# Patient Record
Sex: FEMALE | Race: WHITE | NOT HISPANIC OR LATINO | Employment: OTHER | URBAN - METROPOLITAN AREA
[De-identification: names, ages, dates, MRNs, and addresses within clinical notes are randomized per-mention and may not be internally consistent; named-entity substitution may affect disease eponyms.]

---

## 2017-04-26 ENCOUNTER — HOSPITAL ENCOUNTER (OUTPATIENT)
Dept: RADIOLOGY | Facility: MEDICAL CENTER | Age: 72
End: 2017-04-26
Attending: NURSE PRACTITIONER
Payer: COMMERCIAL

## 2017-04-26 DIAGNOSIS — Z12.31 VISIT FOR SCREENING MAMMOGRAM: ICD-10-CM

## 2017-04-26 PROCEDURE — G0202 SCR MAMMO BI INCL CAD: HCPCS

## 2018-11-07 ENCOUNTER — APPOINTMENT (RX ONLY)
Dept: URBAN - METROPOLITAN AREA CLINIC 4 | Facility: CLINIC | Age: 73
Setting detail: DERMATOLOGY
End: 2018-11-07

## 2018-11-07 DIAGNOSIS — Z71.89 OTHER SPECIFIED COUNSELING: ICD-10-CM

## 2018-11-07 DIAGNOSIS — D22 MELANOCYTIC NEVI: ICD-10-CM

## 2018-11-07 DIAGNOSIS — L81.4 OTHER MELANIN HYPERPIGMENTATION: ICD-10-CM

## 2018-11-07 DIAGNOSIS — L73.8 OTHER SPECIFIED FOLLICULAR DISORDERS: ICD-10-CM

## 2018-11-07 DIAGNOSIS — D18.0 HEMANGIOMA: ICD-10-CM

## 2018-11-07 DIAGNOSIS — L82.1 OTHER SEBORRHEIC KERATOSIS: ICD-10-CM

## 2018-11-07 PROBLEM — D18.01 HEMANGIOMA OF SKIN AND SUBCUTANEOUS TISSUE: Status: ACTIVE | Noted: 2018-11-07

## 2018-11-07 PROBLEM — D22.5 MELANOCYTIC NEVI OF TRUNK: Status: ACTIVE | Noted: 2018-11-07

## 2018-11-07 PROBLEM — D22.62 MELANOCYTIC NEVI OF LEFT UPPER LIMB, INCLUDING SHOULDER: Status: ACTIVE | Noted: 2018-11-07

## 2018-11-07 PROBLEM — D22.39 MELANOCYTIC NEVI OF OTHER PARTS OF FACE: Status: ACTIVE | Noted: 2018-11-07

## 2018-11-07 PROBLEM — D22.72 MELANOCYTIC NEVI OF LEFT LOWER LIMB, INCLUDING HIP: Status: ACTIVE | Noted: 2018-11-07

## 2018-11-07 PROBLEM — D22.71 MELANOCYTIC NEVI OF RIGHT LOWER LIMB, INCLUDING HIP: Status: ACTIVE | Noted: 2018-11-07

## 2018-11-07 PROBLEM — D22.61 MELANOCYTIC NEVI OF RIGHT UPPER LIMB, INCLUDING SHOULDER: Status: ACTIVE | Noted: 2018-11-07

## 2018-11-07 PROCEDURE — 99203 OFFICE O/P NEW LOW 30 MIN: CPT

## 2018-11-07 PROCEDURE — ? COUNSELING

## 2018-11-07 ASSESSMENT — LOCATION SIMPLE DESCRIPTION DERM
LOCATION SIMPLE: CHEST
LOCATION SIMPLE: RIGHT UPPER ARM
LOCATION SIMPLE: LEFT FOREHEAD
LOCATION SIMPLE: ABDOMEN
LOCATION SIMPLE: RIGHT ANTERIOR NECK
LOCATION SIMPLE: SUPERIOR FOREHEAD
LOCATION SIMPLE: RIGHT THIGH
LOCATION SIMPLE: UPPER BACK
LOCATION SIMPLE: LEFT THIGH
LOCATION SIMPLE: LEFT UPPER BACK
LOCATION SIMPLE: LEFT UPPER ARM

## 2018-11-07 ASSESSMENT — LOCATION ZONE DERM
LOCATION ZONE: TRUNK
LOCATION ZONE: FACE
LOCATION ZONE: ARM
LOCATION ZONE: NECK
LOCATION ZONE: LEG

## 2018-11-07 ASSESSMENT — LOCATION DETAILED DESCRIPTION DERM
LOCATION DETAILED: LEFT ANTERIOR DISTAL UPPER ARM
LOCATION DETAILED: LEFT ANTERIOR PROXIMAL UPPER ARM
LOCATION DETAILED: SUPERIOR THORACIC SPINE
LOCATION DETAILED: EPIGASTRIC SKIN
LOCATION DETAILED: LEFT SUPERIOR MEDIAL UPPER BACK
LOCATION DETAILED: LEFT INFERIOR MEDIAL FOREHEAD
LOCATION DETAILED: MIDDLE STERNUM
LOCATION DETAILED: INFERIOR THORACIC SPINE
LOCATION DETAILED: SUPERIOR MID FOREHEAD
LOCATION DETAILED: RIGHT INFERIOR ANTERIOR NECK
LOCATION DETAILED: RIGHT ANTERIOR PROXIMAL UPPER ARM
LOCATION DETAILED: LOWER STERNUM
LOCATION DETAILED: LEFT MEDIAL UPPER BACK
LOCATION DETAILED: RIGHT ANTERIOR PROXIMAL THIGH
LOCATION DETAILED: RIGHT ANTERIOR DISTAL UPPER ARM
LOCATION DETAILED: LEFT MEDIAL FOREHEAD
LOCATION DETAILED: LEFT ANTERIOR PROXIMAL THIGH
LOCATION DETAILED: LEFT SUPERIOR MEDIAL FOREHEAD

## 2019-05-22 ENCOUNTER — HOSPITAL ENCOUNTER (OUTPATIENT)
Dept: RADIOLOGY | Facility: MEDICAL CENTER | Age: 74
End: 2019-05-22
Attending: NURSE PRACTITIONER
Payer: MEDICARE

## 2019-05-22 DIAGNOSIS — Z12.31 SCREENING MAMMOGRAM, ENCOUNTER FOR: ICD-10-CM

## 2019-05-22 PROCEDURE — 77063 BREAST TOMOSYNTHESIS BI: CPT

## 2020-02-05 ENCOUNTER — APPOINTMENT (RX ONLY)
Dept: URBAN - METROPOLITAN AREA CLINIC 4 | Facility: CLINIC | Age: 75
Setting detail: DERMATOLOGY
End: 2020-02-05

## 2020-02-05 DIAGNOSIS — D17 BENIGN LIPOMATOUS NEOPLASM: ICD-10-CM

## 2020-02-05 DIAGNOSIS — L82.1 OTHER SEBORRHEIC KERATOSIS: ICD-10-CM

## 2020-02-05 DIAGNOSIS — L81.8 OTHER SPECIFIED DISORDERS OF PIGMENTATION: ICD-10-CM

## 2020-02-05 DIAGNOSIS — M71 OTHER BURSOPATHIES: ICD-10-CM

## 2020-02-05 DIAGNOSIS — D22 MELANOCYTIC NEVI: ICD-10-CM

## 2020-02-05 DIAGNOSIS — D18.0 HEMANGIOMA: ICD-10-CM

## 2020-02-05 DIAGNOSIS — Z71.89 OTHER SPECIFIED COUNSELING: ICD-10-CM

## 2020-02-05 DIAGNOSIS — L81.4 OTHER MELANIN HYPERPIGMENTATION: ICD-10-CM

## 2020-02-05 PROBLEM — D22.39 MELANOCYTIC NEVI OF OTHER PARTS OF FACE: Status: ACTIVE | Noted: 2020-02-05

## 2020-02-05 PROBLEM — M71.342 OTHER BURSAL CYST, LEFT HAND: Status: ACTIVE | Noted: 2020-02-05

## 2020-02-05 PROBLEM — D22.5 MELANOCYTIC NEVI OF TRUNK: Status: ACTIVE | Noted: 2020-02-05

## 2020-02-05 PROBLEM — D22.61 MELANOCYTIC NEVI OF RIGHT UPPER LIMB, INCLUDING SHOULDER: Status: ACTIVE | Noted: 2020-02-05

## 2020-02-05 PROBLEM — D22.72 MELANOCYTIC NEVI OF LEFT LOWER LIMB, INCLUDING HIP: Status: ACTIVE | Noted: 2020-02-05

## 2020-02-05 PROBLEM — D22.62 MELANOCYTIC NEVI OF LEFT UPPER LIMB, INCLUDING SHOULDER: Status: ACTIVE | Noted: 2020-02-05

## 2020-02-05 PROBLEM — D18.01 HEMANGIOMA OF SKIN AND SUBCUTANEOUS TISSUE: Status: ACTIVE | Noted: 2020-02-05

## 2020-02-05 PROBLEM — D22.71 MELANOCYTIC NEVI OF RIGHT LOWER LIMB, INCLUDING HIP: Status: ACTIVE | Noted: 2020-02-05

## 2020-02-05 PROBLEM — D17.22 BENIGN LIPOMATOUS NEOPLASM OF SKIN AND SUBCUTANEOUS TISSUE OF LEFT ARM: Status: ACTIVE | Noted: 2020-02-05

## 2020-02-05 PROCEDURE — 99214 OFFICE O/P EST MOD 30 MIN: CPT

## 2020-02-05 PROCEDURE — ? COUNSELING

## 2020-02-05 ASSESSMENT — LOCATION DETAILED DESCRIPTION DERM
LOCATION DETAILED: MIDDLE STERNUM
LOCATION DETAILED: INFERIOR MID FOREHEAD
LOCATION DETAILED: LEFT ANTERIOR DISTAL THIGH
LOCATION DETAILED: LEFT PROXIMAL DORSAL FOREARM
LOCATION DETAILED: LEFT MEDIAL FOREHEAD
LOCATION DETAILED: SUPERIOR MID FOREHEAD
LOCATION DETAILED: STERNAL NOTCH
LOCATION DETAILED: RIGHT ANTERIOR PROXIMAL UPPER ARM
LOCATION DETAILED: LEFT MEDIAL UPPER BACK
LOCATION DETAILED: LEFT ANTERIOR PROXIMAL UPPER ARM
LOCATION DETAILED: INFERIOR THORACIC SPINE
LOCATION DETAILED: LEFT INFERIOR MEDIAL FOREHEAD
LOCATION DETAILED: LEFT DISTAL DORSAL MIDDLE FINGER
LOCATION DETAILED: LEFT ANTERIOR DISTAL UPPER ARM
LOCATION DETAILED: EPIGASTRIC SKIN
LOCATION DETAILED: LEFT ANTERIOR PROXIMAL THIGH
LOCATION DETAILED: LEFT ELBOW
LOCATION DETAILED: RIGHT ANTERIOR PROXIMAL THIGH
LOCATION DETAILED: LOWER STERNUM
LOCATION DETAILED: RIGHT ANTERIOR DISTAL UPPER ARM
LOCATION DETAILED: SUPERIOR THORACIC SPINE
LOCATION DETAILED: RIGHT ANTERIOR DISTAL THIGH
LOCATION DETAILED: LEFT SUPERIOR MEDIAL UPPER BACK

## 2020-02-05 ASSESSMENT — LOCATION SIMPLE DESCRIPTION DERM
LOCATION SIMPLE: CHEST
LOCATION SIMPLE: LEFT MIDDLE FINGER
LOCATION SIMPLE: LEFT THIGH
LOCATION SIMPLE: INFERIOR FOREHEAD
LOCATION SIMPLE: LEFT UPPER ARM
LOCATION SIMPLE: RIGHT THIGH
LOCATION SIMPLE: LEFT UPPER BACK
LOCATION SIMPLE: LEFT FOREARM
LOCATION SIMPLE: SUPERIOR FOREHEAD
LOCATION SIMPLE: RIGHT UPPER ARM
LOCATION SIMPLE: LEFT ELBOW
LOCATION SIMPLE: LEFT FOREHEAD
LOCATION SIMPLE: ABDOMEN
LOCATION SIMPLE: UPPER BACK

## 2020-02-05 ASSESSMENT — LOCATION ZONE DERM
LOCATION ZONE: FACE
LOCATION ZONE: ARM
LOCATION ZONE: LEG
LOCATION ZONE: FINGER
LOCATION ZONE: TRUNK

## 2020-05-14 ENCOUNTER — GYNECOLOGY VISIT (OUTPATIENT)
Dept: OBGYN | Facility: CLINIC | Age: 75
End: 2020-05-14
Payer: COMMERCIAL

## 2020-05-14 VITALS — SYSTOLIC BLOOD PRESSURE: 113 MMHG | DIASTOLIC BLOOD PRESSURE: 76 MMHG | BODY MASS INDEX: 26.83 KG/M2 | WEIGHT: 175 LBS

## 2020-05-14 DIAGNOSIS — Z90.710 STATUS POST HYSTERECTOMY: ICD-10-CM

## 2020-05-14 DIAGNOSIS — R07.89 CHEST WALL PAIN: ICD-10-CM

## 2020-05-14 DIAGNOSIS — Z78.0 POST-MENOPAUSAL: ICD-10-CM

## 2020-05-14 DIAGNOSIS — Z12.39 SCREENING FOR BREAST CANCER: ICD-10-CM

## 2020-05-14 PROCEDURE — 99203 OFFICE O/P NEW LOW 30 MIN: CPT | Performed by: OBSTETRICS & GYNECOLOGY

## 2020-05-14 NOTE — PROGRESS NOTES
GYN Visit  CC: breast tenderness     Tracy Sam is a 74 y.o.  who presents today for breast tenderness.  Tender under her left arm.  Unsure if she hit it or lifted something inappropriately.  Notices it mostly when she rolls over in bed.  Only about a 2/10.  Doesn't think there is any mass or other associated symptoms.  She reports she has had annually had mammograms which have always been normal.  Reports that she had vaginal itching about two months ago but it resolved on it's own and she hasn't had any issues sense.  Didn't have any discharge or bleeding.  That was an isolated incidence and she has no current pelvic issues.      GYN History:  No LMP recorded. Patient has had a hysterectomy.. Menarche @12.  Menses regular, lasting 5 days, not particularly heavy.  Last pap  About 10 years ago,  no h/o abnormal pap.  Hysterectomy in 40s unsure reason, then bilateral oophorectomy done later - was then on HRT for many years and stopped it about 4 years ago.  No history of sexually transmitted diseases.  Not currently sexually active.  Used OCPs for about 5 years     OB History:    OB History    Para Term  AB Living   2         2   SAB TAB Ectopic Molar Multiple Live Births             2      # Outcome Date GA Lbr Jamie/2nd Weight Sex Delivery Anes PTL Lv   2             1             CS x 2 due to prior uterine surgery (unsure details)     Review of Systems:   Pertinent positives documented in HPI and all other systems reviewed & are negative.    All PMH, PSH, allergies, social history and FH reviewed and updated today:  Past Medical History:  Past Medical History:   Diagnosis Date   • Arthritis    • Subclavian artery stenosis, right (HCC)     In the setting of traumatic dissection in  follows with        Past Surgical History:  Past Surgical History:   Procedure Laterality Date   • HYSTERECTOMY, TOTAL ABDOMINAL  1990    fibroids   • APPENDECTOMY     • COLONOSCOPY     •  COLONOSCOPY      neg   • HYSTERECTOMY, VAGINAL     • PRIMARY C SECTION      x 2   • ROTATOR CUFF REPAIR      not repaired just monitored     Medications:   Current Outpatient Medications Ordered in Epic   Medication Sig Dispense Refill   • aspirin (ASA) 325 MG Tab Take 325 mg by mouth every 6 hours as needed.       No current Epic-ordered facility-administered medications on file.      Allergies: Patient has no known allergies.    Social History:  Social History     Socioeconomic History   • Marital status:      Spouse name: Not on file   • Number of children: Not on file   • Years of education: Not on file   • Highest education level: Not on file   Occupational History   • Not on file   Social Needs   • Financial resource strain: Not on file   • Food insecurity     Worry: Not on file     Inability: Not on file   • Transportation needs     Medical: Not on file     Non-medical: Not on file   Tobacco Use   • Smoking status: Former Smoker     Last attempt to quit: 2001     Years since quittin.3   • Smokeless tobacco: Never Used   Substance and Sexual Activity   • Alcohol use: Yes     Alcohol/week: 2.5 oz     Types: 5 Standard drinks or equivalent per week     Comment: mod   • Drug use: No   • Sexual activity: Not Currently     Comment:    Lifestyle   • Physical activity     Days per week: Not on file     Minutes per session: Not on file   • Stress: Not on file   Relationships   • Social connections     Talks on phone: Not on file     Gets together: Not on file     Attends [employer] service: Not on file     Active member of club or organization: Not on file     Attends meetings of clubs or organizations: Not on file     Relationship status: Not on file   • Intimate partner violence     Fear of current or ex partner: Not on file     Emotionally abused: Not on file     Physically abused: Not on file     Forced sexual activity: Not on file   Other Topics Concern   • Not on file   Social History  Narrative   • Not on file       Family History:  Family History   Problem Relation Age of Onset   • Heart Disease Father    • Cancer Sister         breast, age 59   • Diabetes Neg Hx    • Stroke Neg Hx       Objective:   Vitals:  /76   Wt 79.4 kg (175 lb)   Body mass index is 26.83 kg/m². (Goal BM I>18 <25)    Physical Exam:   Nursing note and vitals reviewed.  GENERAL: No acute distress  HENT: Atraumatic, normocephalic  EYES: Extraocular movements intact, pupils equal and reactive to light  NECK: Supple, Full ROM  CHEST: No deformities, Equal chest expansion, +chest wall pain over most lateral aspect of ribs 7-8.  Worse with palpation.  Lateral to breast tissue.  Approximates latisimus dorsi border  Bilateral dense breasts without any masses, skin changes, nipple discharge or other issue  RESP: Unlabored, no stridor or audible wheeze  ABD: Soft, Nontender, Non-Distended  Extremities: No Clubbing, Cyanosis, or Edema  Skin: Warm/dry, without rases  Neuro: A/O x 4, CN 2-12 Grossly intact, Motor/sensory grossly intact  Psych: Normal mood, behavior, and affect    Genitourinary: deferred     Assessment/Plan:   Tracy Sam is a 74 y.o.  female who presents for:    1. Screening for breast cancer  MA-DIAGNOSTIC MAMMO BILAT W/TOMOSYNTHESIS W/CAD   2. Status post hysterectomy     3. Post-menopausal     4. Chest wall pain       # Chest wall pain.  Suspect MSK etiology.  Encouraged supportive therapy  #Screen for breast cancer.  Annual mammo ordered  #vulvar itching.  Declines exam today as no current issue - encouraged to return if itching returns  #RTC annually unless issue prior    Patient was seen for 30 minutes of which > 50% of appointment time was spent on face-to-face counseling and coordination of care regarding the above.

## 2020-07-16 ENCOUNTER — HOSPITAL ENCOUNTER (OUTPATIENT)
Dept: RADIOLOGY | Facility: MEDICAL CENTER | Age: 75
End: 2020-07-16
Attending: FAMILY MEDICINE
Payer: COMMERCIAL

## 2020-07-16 DIAGNOSIS — Z12.31 VISIT FOR SCREENING MAMMOGRAM: ICD-10-CM

## 2020-07-16 PROCEDURE — 77067 SCR MAMMO BI INCL CAD: CPT

## 2021-01-14 DIAGNOSIS — Z23 NEED FOR VACCINATION: ICD-10-CM

## 2021-01-25 DIAGNOSIS — Z12.39 ENCOUNTER FOR SCREENING FOR MALIGNANT NEOPLASM OF BREAST, UNSPECIFIED SCREENING MODALITY: ICD-10-CM

## 2021-02-10 ENCOUNTER — APPOINTMENT (RX ONLY)
Dept: URBAN - METROPOLITAN AREA CLINIC 4 | Facility: CLINIC | Age: 76
Setting detail: DERMATOLOGY
End: 2021-02-10

## 2021-02-10 DIAGNOSIS — D18.0 HEMANGIOMA: ICD-10-CM

## 2021-02-10 DIAGNOSIS — D22 MELANOCYTIC NEVI: ICD-10-CM

## 2021-02-10 DIAGNOSIS — M71 OTHER BURSOPATHIES: ICD-10-CM

## 2021-02-10 DIAGNOSIS — L82.1 OTHER SEBORRHEIC KERATOSIS: ICD-10-CM

## 2021-02-10 DIAGNOSIS — L81.8 OTHER SPECIFIED DISORDERS OF PIGMENTATION: ICD-10-CM

## 2021-02-10 DIAGNOSIS — Z71.89 OTHER SPECIFIED COUNSELING: ICD-10-CM

## 2021-02-10 DIAGNOSIS — D17 BENIGN LIPOMATOUS NEOPLASM: ICD-10-CM

## 2021-02-10 DIAGNOSIS — L81.4 OTHER MELANIN HYPERPIGMENTATION: ICD-10-CM

## 2021-02-10 PROBLEM — M71.342 OTHER BURSAL CYST, LEFT HAND: Status: ACTIVE | Noted: 2021-02-10

## 2021-02-10 PROBLEM — D22.71 MELANOCYTIC NEVI OF RIGHT LOWER LIMB, INCLUDING HIP: Status: ACTIVE | Noted: 2021-02-10

## 2021-02-10 PROBLEM — D22.39 MELANOCYTIC NEVI OF OTHER PARTS OF FACE: Status: ACTIVE | Noted: 2021-02-10

## 2021-02-10 PROBLEM — D22.61 MELANOCYTIC NEVI OF RIGHT UPPER LIMB, INCLUDING SHOULDER: Status: ACTIVE | Noted: 2021-02-10

## 2021-02-10 PROBLEM — D17.22 BENIGN LIPOMATOUS NEOPLASM OF SKIN AND SUBCUTANEOUS TISSUE OF LEFT ARM: Status: ACTIVE | Noted: 2021-02-10

## 2021-02-10 PROBLEM — D22.62 MELANOCYTIC NEVI OF LEFT UPPER LIMB, INCLUDING SHOULDER: Status: ACTIVE | Noted: 2021-02-10

## 2021-02-10 PROBLEM — D18.01 HEMANGIOMA OF SKIN AND SUBCUTANEOUS TISSUE: Status: ACTIVE | Noted: 2021-02-10

## 2021-02-10 PROBLEM — D22.72 MELANOCYTIC NEVI OF LEFT LOWER LIMB, INCLUDING HIP: Status: ACTIVE | Noted: 2021-02-10

## 2021-02-10 PROBLEM — D23.39 OTHER BENIGN NEOPLASM OF SKIN OF OTHER PARTS OF FACE: Status: ACTIVE | Noted: 2021-02-10

## 2021-02-10 PROBLEM — D22.5 MELANOCYTIC NEVI OF TRUNK: Status: ACTIVE | Noted: 2021-02-10

## 2021-02-10 PROCEDURE — ? ADDITIONAL NOTES

## 2021-02-10 PROCEDURE — ? PHOTO-DOCUMENTATION

## 2021-02-10 PROCEDURE — ? COUNSELING

## 2021-02-10 PROCEDURE — ? SUNSCREEN TREATMENT REGIMEN

## 2021-02-10 PROCEDURE — 99213 OFFICE O/P EST LOW 20 MIN: CPT

## 2021-02-10 ASSESSMENT — LOCATION DETAILED DESCRIPTION DERM
LOCATION DETAILED: LEFT ANTERIOR PROXIMAL THIGH
LOCATION DETAILED: LEFT SUPERIOR MEDIAL UPPER BACK
LOCATION DETAILED: RIGHT ANTERIOR DISTAL UPPER ARM
LOCATION DETAILED: LEFT ELBOW
LOCATION DETAILED: LEFT MEDIAL FOREHEAD
LOCATION DETAILED: LEFT DISTAL DORSAL MIDDLE FINGER
LOCATION DETAILED: STERNAL NOTCH
LOCATION DETAILED: LEFT INFERIOR MEDIAL FOREHEAD
LOCATION DETAILED: INFERIOR MID FOREHEAD
LOCATION DETAILED: MIDDLE STERNUM
LOCATION DETAILED: LOWER STERNUM
LOCATION DETAILED: LEFT ANTERIOR PROXIMAL UPPER ARM
LOCATION DETAILED: INFERIOR THORACIC SPINE
LOCATION DETAILED: RIGHT ANTERIOR PROXIMAL UPPER ARM
LOCATION DETAILED: LEFT MEDIAL UPPER BACK
LOCATION DETAILED: LEFT ANTERIOR DISTAL THIGH
LOCATION DETAILED: RIGHT ANTERIOR DISTAL THIGH
LOCATION DETAILED: LEFT PROXIMAL DORSAL FOREARM
LOCATION DETAILED: EPIGASTRIC SKIN
LOCATION DETAILED: SUPERIOR MID FOREHEAD
LOCATION DETAILED: SUPERIOR THORACIC SPINE
LOCATION DETAILED: RIGHT ANTERIOR PROXIMAL THIGH
LOCATION DETAILED: LEFT ANTERIOR DISTAL UPPER ARM

## 2021-02-10 ASSESSMENT — LOCATION ZONE DERM
LOCATION ZONE: FINGER
LOCATION ZONE: ARM
LOCATION ZONE: TRUNK
LOCATION ZONE: FACE
LOCATION ZONE: LEG

## 2021-02-10 ASSESSMENT — LOCATION SIMPLE DESCRIPTION DERM
LOCATION SIMPLE: SUPERIOR FOREHEAD
LOCATION SIMPLE: LEFT THIGH
LOCATION SIMPLE: LEFT UPPER BACK
LOCATION SIMPLE: ABDOMEN
LOCATION SIMPLE: UPPER BACK
LOCATION SIMPLE: RIGHT THIGH
LOCATION SIMPLE: CHEST
LOCATION SIMPLE: LEFT UPPER ARM
LOCATION SIMPLE: RIGHT UPPER ARM
LOCATION SIMPLE: INFERIOR FOREHEAD
LOCATION SIMPLE: LEFT FOREHEAD
LOCATION SIMPLE: LEFT ELBOW
LOCATION SIMPLE: LEFT FOREARM
LOCATION SIMPLE: LEFT MIDDLE FINGER

## 2021-02-10 NOTE — PROCEDURE: ADDITIONAL NOTES
Additional Notes: Patient has been evaluated by hand specialist [facility], they recommend monitoring.
Render Risk Assessment In Note?: no
Detail Level: Simple

## 2021-02-10 NOTE — PROCEDURE: PHOTO-DOCUMENTATION
Details (Free Text): Lesion left eyelid photographed today. Will have patient RTC in 6-8 weeks to recheck. If lesion changes in the meantime, patient will call in which case we will refer to MD for biopsy.
Detail Level: Detailed
Photo Preface (Leave Blank If You Do Not Want): Photographs were obtained today

## 2021-02-19 ENCOUNTER — HOSPITAL ENCOUNTER (OUTPATIENT)
Dept: RADIOLOGY | Facility: MEDICAL CENTER | Age: 76
End: 2021-02-19
Attending: FAMILY MEDICINE
Payer: MEDICARE

## 2021-02-19 DIAGNOSIS — N95.9 MENOPAUSAL PROBLEM: ICD-10-CM

## 2021-02-19 DIAGNOSIS — N63.23 LUMP IN LOWER OUTER QUADRANT OF LEFT BREAST: ICD-10-CM

## 2021-02-19 DIAGNOSIS — M25.511 RIGHT SHOULDER PAIN, UNSPECIFIED CHRONICITY: ICD-10-CM

## 2021-02-19 DIAGNOSIS — N95.1 SYMPTOMATIC MENOPAUSAL OR FEMALE CLIMACTERIC STATES: ICD-10-CM

## 2021-02-19 PROCEDURE — G0279 TOMOSYNTHESIS, MAMMO: HCPCS | Mod: LT

## 2021-02-19 PROCEDURE — 73030 X-RAY EXAM OF SHOULDER: CPT | Mod: RT

## 2021-02-19 PROCEDURE — 76642 ULTRASOUND BREAST LIMITED: CPT | Mod: LT

## 2021-02-19 PROCEDURE — 77080 DXA BONE DENSITY AXIAL: CPT

## 2021-04-20 ENCOUNTER — APPOINTMENT (RX ONLY)
Dept: URBAN - METROPOLITAN AREA CLINIC 4 | Facility: CLINIC | Age: 76
Setting detail: DERMATOLOGY
End: 2021-04-20

## 2021-04-20 DIAGNOSIS — L81.4 OTHER MELANIN HYPERPIGMENTATION: ICD-10-CM

## 2021-04-20 DIAGNOSIS — Z71.89 OTHER SPECIFIED COUNSELING: ICD-10-CM

## 2021-04-20 PROBLEM — D23.39 OTHER BENIGN NEOPLASM OF SKIN OF OTHER PARTS OF FACE: Status: ACTIVE | Noted: 2021-04-20

## 2021-04-20 PROCEDURE — 99213 OFFICE O/P EST LOW 20 MIN: CPT

## 2021-04-20 PROCEDURE — ? COUNSELING

## 2021-04-20 PROCEDURE — ? SUNSCREEN TREATMENT REGIMEN

## 2021-04-20 PROCEDURE — ? PHOTO-DOCUMENTATION

## 2021-04-20 PROCEDURE — ? ADDITIONAL NOTES

## 2021-04-20 ASSESSMENT — LOCATION DETAILED DESCRIPTION DERM
LOCATION DETAILED: LEFT INFERIOR MEDIAL FOREHEAD
LOCATION DETAILED: LOWER STERNUM
LOCATION DETAILED: RIGHT ANTERIOR DISTAL THIGH
LOCATION DETAILED: RIGHT ANTERIOR DISTAL UPPER ARM
LOCATION DETAILED: SUPERIOR MID FOREHEAD
LOCATION DETAILED: LEFT ANTERIOR DISTAL THIGH
LOCATION DETAILED: LEFT SUPERIOR MEDIAL UPPER BACK
LOCATION DETAILED: LEFT ANTERIOR DISTAL UPPER ARM

## 2021-04-20 ASSESSMENT — LOCATION SIMPLE DESCRIPTION DERM
LOCATION SIMPLE: RIGHT UPPER ARM
LOCATION SIMPLE: SUPERIOR FOREHEAD
LOCATION SIMPLE: LEFT FOREHEAD
LOCATION SIMPLE: RIGHT THIGH
LOCATION SIMPLE: LEFT THIGH
LOCATION SIMPLE: CHEST
LOCATION SIMPLE: LEFT UPPER BACK
LOCATION SIMPLE: LEFT UPPER ARM

## 2021-04-20 ASSESSMENT — LOCATION ZONE DERM
LOCATION ZONE: LEG
LOCATION ZONE: TRUNK
LOCATION ZONE: FACE
LOCATION ZONE: ARM

## 2021-04-20 NOTE — PROCEDURE: PHOTO-DOCUMENTATION
Details (Free Text): Lesion left eyelid photographed again today. No change vs last visit, will continue to monitor closely. Discussed biopsy, patient wishes to monitor.
Detail Level: Detailed
Photo Preface (Leave Blank If You Do Not Want): Photographs were obtained today

## 2021-04-20 NOTE — PROCEDURE: ADDITIONAL NOTES
Detail Level: Simple
Render Risk Assessment In Note?: no
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

## 2021-04-23 ENCOUNTER — HOSPITAL ENCOUNTER (OUTPATIENT)
Dept: RADIOLOGY | Facility: MEDICAL CENTER | Age: 76
End: 2021-04-23
Attending: FAMILY MEDICINE
Payer: MEDICARE

## 2021-04-23 DIAGNOSIS — M54.50 LOW BACK PAIN, UNSPECIFIED BACK PAIN LATERALITY, UNSPECIFIED CHRONICITY, UNSPECIFIED WHETHER SCIATICA PRESENT: ICD-10-CM

## 2021-04-23 PROCEDURE — 72110 X-RAY EXAM L-2 SPINE 4/>VWS: CPT

## 2021-08-04 PROBLEM — M67.442 DIGITAL MUCOUS CYST OF LEFT HAND: Status: ACTIVE | Noted: 2021-08-04

## 2021-08-10 ENCOUNTER — TELEPHONE (OUTPATIENT)
Dept: SURGERY | Facility: MEDICAL CENTER | Age: 76
End: 2021-08-10

## 2022-04-19 ENCOUNTER — OFFICE VISIT (OUTPATIENT)
Dept: CARDIOLOGY | Facility: MEDICAL CENTER | Age: 77
End: 2022-04-19
Payer: MEDICARE

## 2022-04-19 VITALS
OXYGEN SATURATION: 97 % | WEIGHT: 172 LBS | SYSTOLIC BLOOD PRESSURE: 142 MMHG | RESPIRATION RATE: 16 BRPM | HEIGHT: 68 IN | DIASTOLIC BLOOD PRESSURE: 74 MMHG | HEART RATE: 62 BPM | BODY MASS INDEX: 26.07 KG/M2

## 2022-04-19 DIAGNOSIS — I10 ESSENTIAL HYPERTENSION, BENIGN: ICD-10-CM

## 2022-04-19 DIAGNOSIS — I77.1 SUBCLAVIAN ARTERY STENOSIS, RIGHT (HCC): ICD-10-CM

## 2022-04-19 DIAGNOSIS — E78.5 DYSLIPIDEMIA: ICD-10-CM

## 2022-04-19 LAB — EKG IMPRESSION: NORMAL

## 2022-04-19 PROCEDURE — 99204 OFFICE O/P NEW MOD 45 MIN: CPT | Performed by: INTERNAL MEDICINE

## 2022-04-19 PROCEDURE — 93000 ELECTROCARDIOGRAM COMPLETE: CPT | Mod: GZ | Performed by: INTERNAL MEDICINE

## 2022-04-19 RX ORDER — M-VIT,TX,IRON,MINS/CALC/FOLIC 27MG-0.4MG
1 TABLET ORAL DAILY
COMMUNITY

## 2022-04-19 ASSESSMENT — ENCOUNTER SYMPTOMS
CONSTITUTIONAL NEGATIVE: 1
HEADACHES: 0
WEIGHT LOSS: 0
DEPRESSION: 0
RESPIRATORY NEGATIVE: 1
NEUROLOGICAL NEGATIVE: 1
ABDOMINAL PAIN: 0
PALPITATIONS: 0
NERVOUS/ANXIOUS: 0
VOMITING: 0
MYALGIAS: 0
GASTROINTESTINAL NEGATIVE: 1
CLAUDICATION: 0
FEVER: 0
CHILLS: 0
MUSCULOSKELETAL NEGATIVE: 1
SHORTNESS OF BREATH: 0
NAUSEA: 0
FOCAL WEAKNESS: 0
BLURRED VISION: 0
BRUISES/BLEEDS EASILY: 0
DIZZINESS: 0
COUGH: 0
PSYCHIATRIC NEGATIVE: 1
WEAKNESS: 0
DOUBLE VISION: 0
EYES NEGATIVE: 1

## 2022-04-19 NOTE — PROGRESS NOTES
Chief Complaint   Patient presents with   • Other     F/V Dx: Subclavian artery stenosis, right (CMS-HCC)       Subjective     Tracy Sam is a 76 y.o. 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    Ms. 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 She was previously seen in our office by Holden Wilson on 16.    Past Medical History:   Diagnosis Date   • Arthritis    • Subclavian artery stenosis, right (HCC)     In the setting of traumatic dissection in  follows with    • Subclavian artery stenosis, right (HCC)      Past Surgical History:   Procedure Laterality Date   • HYSTERECTOMY, TOTAL ABDOMINAL      fibroids   • APPENDECTOMY     • COLONOSCOPY     • COLONOSCOPY      neg   • HYSTERECTOMY, VAGINAL     • PRIMARY C SECTION      x 2   • ROTATOR CUFF REPAIR      not repaired just monitored     Family History   Problem Relation Age of Onset   • Heart Disease Father    • Cancer Sister         breast, age 59   • Diabetes Neg Hx    • Stroke Neg Hx      Social History     Socioeconomic History   • Marital status:      Spouse name: Not on file   • Number of children: Not on file   • Years of education: Not on file   • Highest education level: Not on file   Occupational History   • Not on file   Tobacco Use   • Smoking status: Former Smoker     Quit date: 2001     Years since quittin.3   • Smokeless tobacco: Never Used   Substance and Sexual Activity   • Alcohol use: Yes     Alcohol/week: 1.2 oz     Types: 2 Glasses of wine per week   • Drug use: No   • Sexual activity: Not Currently     Comment:    Other Topics Concern   • Not on file   Social History Narrative   • Not on file     Social Determinants of Health     Financial Resource Strain: Not on file   Food  "Insecurity: Not on file   Transportation Needs: Not on file   Physical Activity: Not on file   Stress: Not on file   Social Connections: Not on file   Intimate Partner Violence: Not on file   Housing Stability: Not on file     No Known Allergies     (Medications reviewed.)  Outpatient Encounter Medications as of 4/19/2022   Medication Sig Dispense Refill   • therapeutic multivitamin-minerals (THERAGRAN-M) Tab Take 1 Tablet by mouth every day.     • ketoconazole (NIZORAL) 2 % Cream APPLY CREAM TOPICALLY TWICE DAILY TO TOES AND FEET     • [DISCONTINUED] aspirin (ASA) 325 MG Tab Take 325 mg by mouth every 6 hours as needed.       No facility-administered encounter medications on file as of 4/19/2022.     Review of Systems   Constitutional: Negative.  Negative for chills, fever, malaise/fatigue and weight loss.   HENT: Negative.  Negative for hearing loss.    Eyes: Negative.  Negative for blurred vision and double vision.   Respiratory: Negative.  Negative for cough and shortness of breath.    Cardiovascular: Positive for chest pain. Negative for palpitations, claudication and leg swelling.   Gastrointestinal: Negative.  Negative for abdominal pain, nausea and vomiting.   Genitourinary: Negative.  Negative for dysuria and urgency.   Musculoskeletal: Negative.  Negative for joint pain and myalgias.   Skin: Negative.  Negative for itching and rash.   Neurological: Negative.  Negative for dizziness, focal weakness, weakness and headaches.   Endo/Heme/Allergies: Negative.  Does not bruise/bleed easily.   Psychiatric/Behavioral: Negative.  Negative for depression. The patient is not nervous/anxious.               Objective     /74 (BP Location: Left arm, Patient Position: Sitting, BP Cuff Size: Adult)   Pulse 62   Resp 16   Ht 1.727 m (5' 8\")   Wt 78 kg (172 lb)   SpO2 97%   BMI 26.15 kg/m²     Physical Exam  Constitutional:       Appearance: She is well-developed.   HENT:      Head: Normocephalic and atraumatic. "   Neck:      Vascular: No JVD.   Cardiovascular:      Rate and Rhythm: Normal rate and regular rhythm.      Heart sounds: Normal heart sounds.   Pulmonary:      Effort: Pulmonary effort is normal.      Breath sounds: Normal breath sounds.   Abdominal:      General: Bowel sounds are normal.      Palpations: Abdomen is soft.      Comments: No hepatosplenomegaly.   Musculoskeletal:         General: Normal range of motion.   Lymphadenopathy:      Cervical: No cervical adenopathy.   Skin:     General: Skin is warm and dry.   Neurological:      Mental Status: She is alert and oriented to person, place, and time.            CARDIAC STUDIES/PROCEDURES:    CTA OF ABDOMEN (02/21/13)  Calcified atherosclerosis may be seen of the abdominal aorta.   (study result reviewed)    CTA OF ABDOMEN (12/01/17)  No abdominal aorta aneurysm.  (study result reviewed)    ECHOCARDIOGRAM CONCLUSIONS (07/20/16)  No prior study is available for comparison.   Normal left ventricular chamber size.  Left ventricular ejection fraction is visually estimated to be 60%.  Grade I diastolic dysfunction.  Mild mitral regurgitation.  Mild tricuspid regurgitation.  Estimated right ventricular systolic pressure  is 25 mmHg.  (study result reviewed)    EKG was ordered for subclavian artery stenosis, performed on (04/19/22) was reviewed: EKG, personally interpreted shows sinus rhythm.    Laboratory results of (04/22) were reviewed. Cholesterol profile of 240/153/65/153 mg/dL noted.    MYOCARDIAL PERFUSION STUDY CONCLUSIONS (04/08/16)  Normal left ventricular size, ejection fraction, and wall motion.  (study result reviewed)    UPPER EXTREMITY ARTERIAL ULTRASOUND [facility] (04/26/05)  A long segment narrowing in the subclavian as well as axillary artery is noted. This may be due to wall thickening or thrombus. Distal narrowing with post stenotic flow in the rest of the arm is visualized.  On the left there is also abnormal flow pattern peripherally, with  relative normal flow patterns in the upper arm.  Proximally the subclavian artery has an irregular outline and an abnormal wave pattern.  The peak systolic velocity is however not decreased.  The common carotid arteries appear normal with normal doppler examination.   (study result reviewed)    Assessment & Plan     1. Subclavian artery stenosis, right (HCC)  EKG   2. Essential hypertension, benign     3. Dyslipidemia         Medical Decision Making: Today's Assessment/Status/Plan:        1. 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  Vascular surgeon's report on 06/09/05: The patient had previous trauma to the shoulder girdle in February and this most probably caused dissection of the subclavian artery. There are no clinical sign of acute ischemia at present and there is an excellent chance that the artery will remodel over time. I have placed her on clopidogrel and she should be followed up regularly. If the condition of the arm worsens, she could be considered for PTA and stenting, alternatively axillary bypass, although I do not foresee this happening. We did discuss the options of performing myocardial perfusion imaging study, echocardiogram, however, she does not wish to schedule this procedure at this time.  2. Hypertension: Blood pressure has been high. We will start metoprolol 25 mg BID and reassess the blood pressure.  3. Hyperlipidemia: We will order labs.  Greater than 45 minutes of time was spent to review all above information; of which more than 50% of the time was face to face reviewing thee patient's medical issues, medication evaluation, study result review, lab results review    We will follow up in 3 months.    CC Italia Rodríguez

## 2022-07-15 ENCOUNTER — HOSPITAL ENCOUNTER (OUTPATIENT)
Dept: LAB | Facility: MEDICAL CENTER | Age: 77
End: 2022-07-15
Attending: INTERNAL MEDICINE
Payer: MEDICARE

## 2022-07-15 DIAGNOSIS — E78.5 DYSLIPIDEMIA: ICD-10-CM

## 2022-07-15 LAB
ALBUMIN SERPL BCP-MCNC: 4.1 G/DL (ref 3.2–4.9)
ALBUMIN/GLOB SERPL: 1.6 G/DL
ALP SERPL-CCNC: 58 U/L (ref 30–99)
ALT SERPL-CCNC: 13 U/L (ref 2–50)
ANION GAP SERPL CALC-SCNC: 13 MMOL/L (ref 7–16)
AST SERPL-CCNC: 17 U/L (ref 12–45)
BILIRUB SERPL-MCNC: 0.4 MG/DL (ref 0.1–1.5)
BUN SERPL-MCNC: 16 MG/DL (ref 8–22)
CALCIUM SERPL-MCNC: 9.3 MG/DL (ref 8.5–10.5)
CHLORIDE SERPL-SCNC: 103 MMOL/L (ref 96–112)
CHOLEST SERPL-MCNC: 192 MG/DL (ref 100–199)
CO2 SERPL-SCNC: 24 MMOL/L (ref 20–33)
CREAT SERPL-MCNC: 0.74 MG/DL (ref 0.5–1.4)
FASTING STATUS PATIENT QL REPORTED: NORMAL
GFR SERPLBLD CREATININE-BSD FMLA CKD-EPI: 83 ML/MIN/1.73 M 2
GLOBULIN SER CALC-MCNC: 2.5 G/DL (ref 1.9–3.5)
GLUCOSE SERPL-MCNC: 86 MG/DL (ref 65–99)
HDLC SERPL-MCNC: 59 MG/DL
LDLC SERPL CALC-MCNC: 118 MG/DL
POTASSIUM SERPL-SCNC: 3.8 MMOL/L (ref 3.6–5.5)
PROT SERPL-MCNC: 6.6 G/DL (ref 6–8.2)
SODIUM SERPL-SCNC: 140 MMOL/L (ref 135–145)
TRIGL SERPL-MCNC: 76 MG/DL (ref 0–149)

## 2022-07-15 PROCEDURE — 80061 LIPID PANEL: CPT

## 2022-07-15 PROCEDURE — 36415 COLL VENOUS BLD VENIPUNCTURE: CPT

## 2022-07-15 PROCEDURE — 80053 COMPREHEN METABOLIC PANEL: CPT

## 2022-07-18 ENCOUNTER — HOSPITAL ENCOUNTER (OUTPATIENT)
Dept: RADIOLOGY | Facility: MEDICAL CENTER | Age: 77
End: 2022-07-18
Attending: INTERNAL MEDICINE
Payer: MEDICARE

## 2022-07-18 DIAGNOSIS — I77.1 SUBCLAVIAN ARTERY STENOSIS, RIGHT (HCC): ICD-10-CM

## 2022-07-18 PROCEDURE — 700117 HCHG RX CONTRAST REV CODE 255: Performed by: INTERNAL MEDICINE

## 2022-07-18 PROCEDURE — 71275 CT ANGIOGRAPHY CHEST: CPT | Mod: MG

## 2022-07-18 RX ADMIN — IOHEXOL 100 ML: 350 INJECTION, SOLUTION INTRAVENOUS at 14:45

## 2022-07-29 ENCOUNTER — OFFICE VISIT (OUTPATIENT)
Dept: CARDIOLOGY | Facility: MEDICAL CENTER | Age: 77
End: 2022-07-29
Payer: MEDICARE

## 2022-07-29 VITALS
HEART RATE: 66 BPM | SYSTOLIC BLOOD PRESSURE: 118 MMHG | OXYGEN SATURATION: 97 % | RESPIRATION RATE: 14 BRPM | WEIGHT: 167.3 LBS | DIASTOLIC BLOOD PRESSURE: 72 MMHG | HEIGHT: 68 IN | BODY MASS INDEX: 25.36 KG/M2

## 2022-07-29 DIAGNOSIS — I73.9 PVD (PERIPHERAL VASCULAR DISEASE) (HCC): ICD-10-CM

## 2022-07-29 DIAGNOSIS — E78.5 DYSLIPIDEMIA: ICD-10-CM

## 2022-07-29 DIAGNOSIS — I10 ESSENTIAL HYPERTENSION, BENIGN: ICD-10-CM

## 2022-07-29 PROCEDURE — 99214 OFFICE O/P EST MOD 30 MIN: CPT | Performed by: INTERNAL MEDICINE

## 2022-07-29 ASSESSMENT — ENCOUNTER SYMPTOMS
COUGH: 0
NERVOUS/ANXIOUS: 0
NEUROLOGICAL NEGATIVE: 1
DEPRESSION: 0
MYALGIAS: 0
CARDIOVASCULAR NEGATIVE: 1
BLURRED VISION: 0
PALPITATIONS: 0
FOCAL WEAKNESS: 0
WEIGHT LOSS: 0
VOMITING: 0
HEADACHES: 0
CLAUDICATION: 0
NAUSEA: 0
DOUBLE VISION: 0
EYES NEGATIVE: 1
PSYCHIATRIC NEGATIVE: 1
FEVER: 0
WEAKNESS: 0
CONSTITUTIONAL NEGATIVE: 1
ABDOMINAL PAIN: 0
MUSCULOSKELETAL NEGATIVE: 1
GASTROINTESTINAL NEGATIVE: 1
BRUISES/BLEEDS EASILY: 0
SHORTNESS OF BREATH: 0
DIZZINESS: 0
RESPIRATORY NEGATIVE: 1
CHILLS: 0

## 2022-07-29 NOTE — PROGRESS NOTES
Chief Complaint   Patient presents with   • Aortic Stenosis     F/V Dx: Subclavian artery stenosis, right (HCC)       Subjective     Tracy Sam is a 77 y.o. female who presents today for follow up of hypertension, medication change evaluation, blood pressure assessment, peripheral vascular disease.    Since the patient's last visit on 22, she has been doing well clinically. She denies chest pain, shortness of breath, palpitations, nausea/vomiting or diaphoresis. On the last visit she was started on metoprolol and is tolerating this medication well without side effects. She is pending eye lid surgery.     Past Medical History:   Diagnosis Date   • Arthritis    • Dyslipidemia 2022   • Essential hypertension, benign 2022   • Subclavian artery stenosis, right (HCC)     In the setting of traumatic dissection in  follows with    • Subclavian artery stenosis, right (HCC)      Past Surgical History:   Procedure Laterality Date   • HYSTERECTOMY, TOTAL ABDOMINAL  1990    fibroids   • APPENDECTOMY     • COLONOSCOPY     • COLONOSCOPY      neg   • HYSTERECTOMY, VAGINAL     • PRIMARY C SECTION      x 2   • ROTATOR CUFF REPAIR      not repaired just monitored     Family History   Problem Relation Age of Onset   • Heart Disease Father    • Cancer Sister         breast, age 59   • Diabetes Neg Hx    • Stroke Neg Hx      Social History     Socioeconomic History   • Marital status:      Spouse name: Not on file   • Number of children: Not on file   • Years of education: Not on file   • Highest education level: Not on file   Occupational History   • Not on file   Tobacco Use   • Smoking status: Former Smoker     Quit date: 2001     Years since quittin.5   • Smokeless tobacco: Never Used   Substance and Sexual Activity   • Alcohol use: Yes     Alcohol/week: 1.2 oz     Types: 2 Glasses of wine per week   • Drug use: No   • Sexual activity: Not Currently     Comment:    Other  Topics Concern   • Not on file   Social History Narrative   • Not on file     Social Determinants of Health     Financial Resource Strain: Not on file   Food Insecurity: Not on file   Transportation Needs: Not on file   Physical Activity: Not on file   Stress: Not on file   Social Connections: Not on file   Intimate Partner Violence: Not on file   Housing Stability: Not on file     No Known Allergies     (Medications reviewed.)  Outpatient Encounter Medications as of 7/29/2022   Medication Sig Dispense Refill   • therapeutic multivitamin-minerals (THERAGRAN-M) Tab Take 1 Tablet by mouth every day.     • ketoconazole (NIZORAL) 2 % Cream APPLY CREAM TOPICALLY TWICE DAILY TO TOES AND FEET     • metoprolol tartrate (LOPRESSOR) 25 MG Tab Take 1 Tablet by mouth 2 times a day. (Patient not taking: Reported on 7/29/2022) 60 Tablet 11     No facility-administered encounter medications on file as of 7/29/2022.     Review of Systems   Constitutional: Negative.  Negative for chills, fever, malaise/fatigue and weight loss.   HENT: Negative.  Negative for hearing loss.    Eyes: Negative.  Negative for blurred vision and double vision.   Respiratory: Negative.  Negative for cough and shortness of breath.    Cardiovascular: Negative.  Negative for chest pain, palpitations, claudication and leg swelling.   Gastrointestinal: Negative.  Negative for abdominal pain, nausea and vomiting.   Genitourinary: Negative.  Negative for dysuria and urgency.   Musculoskeletal: Negative.  Negative for joint pain and myalgias.   Skin: Negative.  Negative for itching and rash.   Neurological: Negative.  Negative for dizziness, focal weakness, weakness and headaches.   Endo/Heme/Allergies: Negative.  Does not bruise/bleed easily.   Psychiatric/Behavioral: Negative.  Negative for depression. The patient is not nervous/anxious.               Objective     /72 (BP Location: Left arm, Patient Position: Sitting, BP Cuff Size: Adult)   Pulse 66    "Resp 14   Ht 1.727 m (5' 8\")   Wt 75.9 kg (167 lb 4.8 oz)   SpO2 97%   BMI 25.44 kg/m²     Physical Exam  Constitutional:       Appearance: She is well-developed.   HENT:      Head: Normocephalic and atraumatic.   Neck:      Vascular: No JVD.   Cardiovascular:      Rate and Rhythm: Normal rate and regular rhythm.      Heart sounds: Normal heart sounds.   Pulmonary:      Effort: Pulmonary effort is normal.      Breath sounds: Normal breath sounds.   Abdominal:      General: Bowel sounds are normal.      Palpations: Abdomen is soft.      Comments: No hepatosplenomegaly.   Musculoskeletal:         General: Normal range of motion.   Lymphadenopathy:      Cervical: No cervical adenopathy.   Skin:     General: Skin is warm and dry.   Neurological:      Mental Status: She is alert and oriented to person, place, and time.            CARDIAC STUDIES/PROCEDURES:     CTA OF ABDOMEN (02/21/13)  Calcified atherosclerosis may be seen of the abdominal aorta.      CTA OF ABDOMEN (12/01/17)  No abdominal aorta aneurysm.     ECHOCARDIOGRAM CONCLUSIONS (07/20/16)  No prior study is available for comparison.   Normal left ventricular chamber size.  Left ventricular ejection fraction is visually estimated to be 60%.  Grade I diastolic dysfunction.  Mild mitral regurgitation.  Mild tricuspid regurgitation.  Estimated right ventricular systolic pressure  is 25 mmHg.     EKG performed on (04/19/22) EKG shows sinus rhythm.    Laboratory results of (07/15/22) were reviewed. Cholesterol profile of 192/76/59/118 mg/dL noted.  Laboratory results of (04/22) Cholesterol profile of 240/153/65/153 mg/dL noted.     MYOCARDIAL PERFUSION STUDY CONCLUSIONS (04/08/16)  Normal left ventricular size, ejection fraction, and wall motion.    UPPER EXTREMITY ARTERIAL ULTRASOUND [facility] (04/26/05)  A long segment narrowing in the subclavian as well as axillary artery is noted. This may be due to wall thickening or thrombus. Distal narrowing with post " stenotic flow in the rest of the arm is visualized.  On the left there is also abnormal flow pattern peripherally, with relative normal flow patterns in the upper arm.  Proximally the subclavian artery has an irregular outline and an abnormal wave pattern.  The peak systolic velocity is however not decreased.  The common carotid arteries appear normal with normal doppler examination.     Assessment & Plan     1. Essential hypertension, benign     2. Dyslipidemia     3. PVD (peripheral vascular disease) ([facility])         Medical Decision Making: Today's Assessment/Status/Plan:        1. Hypertension: Blood pressure is well controlled. We will continue with metoprolol.  2. Hyperlipidemia: Currently not well controlled on strict diet and exercise therapy. We will repeat labs including fasting lipid profile in 3 months.  3. 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 There are no clinical sign of acute ischemia at present and there is an excellent chance that the artery will remodel over time. I have placed her on clopidogrel and she should be followed up regularly. If the condition of the arm worsens, she could be considered for PTA and stenting, alternatively axillary bypass, although I do not foresee this happening) Stable on above medical therapy. We discussed option of referral to vascular surgery, however, she declines.   4. Health maintenance: We have discussed the options of performing myocardial perfusion imaging study, echocardiogram, however, she does not wish to schedule this procedure at this time.    We will follow up in 3 months.    CC Italia Rodríguez

## 2022-08-23 ENCOUNTER — TELEPHONE (OUTPATIENT)
Dept: CARDIOLOGY | Facility: MEDICAL CENTER | Age: 77
End: 2022-08-23
Payer: MEDICARE

## 2022-08-24 NOTE — TELEPHONE ENCOUNTER
Received clearance request from [facility]. Discussed with SC who indicates patient may proceed with bilateral upper lid blepharoplasty. Clearance response faxed to [phone], receipt confirmed.

## 2022-11-11 ENCOUNTER — PATIENT MESSAGE (OUTPATIENT)
Dept: HEALTH INFORMATION MANAGEMENT | Facility: OTHER | Age: 77
End: 2022-11-11

## 2022-12-06 ENCOUNTER — HOSPITAL ENCOUNTER (OUTPATIENT)
Dept: RADIOLOGY | Facility: MEDICAL CENTER | Age: 77
End: 2022-12-06
Attending: FAMILY MEDICINE
Payer: MEDICARE

## 2022-12-06 DIAGNOSIS — M25.511 RIGHT SHOULDER PAIN, UNSPECIFIED CHRONICITY: ICD-10-CM

## 2022-12-06 DIAGNOSIS — M79.601 RIGHT UPPER LIMB PAIN: ICD-10-CM

## 2022-12-06 PROCEDURE — 73060 X-RAY EXAM OF HUMERUS: CPT | Mod: RT

## 2022-12-06 PROCEDURE — 73030 X-RAY EXAM OF SHOULDER: CPT | Mod: RT

## 2023-01-17 ENCOUNTER — APPOINTMENT (OUTPATIENT)
Dept: VASCULAR SURGERY | Facility: MEDICAL CENTER | Age: 78
End: 2023-01-17
Payer: MEDICARE

## 2023-02-02 ENCOUNTER — OFFICE VISIT (OUTPATIENT)
Dept: VASCULAR SURGERY | Facility: MEDICAL CENTER | Age: 78
End: 2023-02-02
Payer: MEDICARE

## 2023-02-02 VITALS
DIASTOLIC BLOOD PRESSURE: 58 MMHG | TEMPERATURE: 97.3 F | OXYGEN SATURATION: 99 % | HEIGHT: 68 IN | BODY MASS INDEX: 25.46 KG/M2 | WEIGHT: 168 LBS | HEART RATE: 63 BPM | SYSTOLIC BLOOD PRESSURE: 112 MMHG

## 2023-02-02 DIAGNOSIS — S25.101A: ICD-10-CM

## 2023-02-02 PROCEDURE — 99202 OFFICE O/P NEW SF 15 MIN: CPT | Performed by: SURGERY

## 2023-02-02 NOTE — H&P
Vascular Surgery            New Patient Consultation    Patient:Tracy Sam  MRN:[id number]  Primary care physician:Italia Crain M.D.  Referring Provider: Dandy Cunha DO    Vascular Consultant: Josh Huber MD    Date: 2/2/2023  _____________________________________________________    Chief Complaint:     Chief Complaint   Patient presents with    New Patient      Atherosclerosis of other arteries         History of Present Illness:   Tracy Sam  is a 77 y.o. year old female who was referred for evaluation of her right subclavian artery.  The patient reports that several years ago she sustained trauma to her right shoulder region and had an associated right subclavian artery injury.  This all occurred in [place].  The patient recently had a pacemaker placed and during the course of that evaluation it was deemed that she perhaps needed follow-up for this right subclavian issue.  The patient reports that she still has some disability related to the shoulder injury but does not complain specifically of ischemic type symptoms of the right upper extremity.  Patient did undergo a CTA of the chest which demonstrated no significant abnormalities.  Today in the office the patient has equal blood pressures in both arms.  Patient has a normal peripheral arterial exam.    Past Medical History:     Past Medical History:   Diagnosis Date    Arthritis     Dyslipidemia 7/29/2022    Essential hypertension, benign 7/29/2022    Subclavian artery stenosis, right (HCC)     In the setting of traumatic dissection in 2005 follows with     Subclavian artery stenosis, right (HCC)      Past Surgical History:     Past Surgical History:   Procedure Laterality Date    HYSTERECTOMY, TOTAL ABDOMINAL  1990    fibroids    APPENDECTOMY      COLONOSCOPY      COLONOSCOPY  2098    neg    HYSTERECTOMY, VAGINAL      PRIMARY C SECTION      x 2    ROTATOR CUFF REPAIR      not repaired just monitored      Allergies:   No Known Allergies  Medications:     Outpatient Encounter Medications as of 2023   Medication Sig Dispense Refill    therapeutic multivitamin-minerals (THERAGRAN-M) Tab Take 1 Tablet by mouth every day.      metoprolol tartrate (LOPRESSOR) 25 MG Tab Take 1 Tablet by mouth 2 times a day. (Patient not taking: Reported on 2022) 60 Tablet 11    ketoconazole (NIZORAL) 2 % Cream APPLY CREAM TOPICALLY TWICE DAILY TO TOES AND FEET       No facility-administered encounter medications on file as of 2023.     Social History:     Social History     Socioeconomic History    Marital status:      Spouse name: Not on file    Number of children: Not on file    Years of education: Not on file    Highest education level: Not on file   Occupational History    Not on file   Tobacco Use    Smoking status: Former     Types: Cigarettes     Quit date: 2001     Years since quittin.1    Smokeless tobacco: Never   Substance and Sexual Activity    Alcohol use: Yes     Alcohol/week: 1.2 oz     Types: 2 Glasses of wine per week    Drug use: No    Sexual activity: Not Currently     Comment:    Other Topics Concern    Not on file   Social History Narrative    Not on file     Social Determinants of Health     Financial Resource Strain: Not on file   Food Insecurity: Not on file   Transportation Needs: Not on file   Physical Activity: Not on file   Stress: Not on file   Social Connections: Not on file   Intimate Partner Violence: Not on file   Housing Stability: Not on file      Social History     Tobacco Use   Smoking Status Former    Types: Cigarettes    Quit date: 2001    Years since quittin.1   Smokeless Tobacco Never     Social History     Substance and Sexual Activity   Alcohol Use Yes    Alcohol/week: 1.2 oz    Types: 2 Glasses of wine per week     Social History     Substance and Sexual Activity   Drug Use No      Family History:     Family History   Problem Relation Age of Onset  "   Heart Disease Father     Cancer Sister         breast, age 59    Diabetes Neg Hx     Stroke Neg Hx        Review of Systems:   Constitutional: Negative for fever or chills  HENT:   Negative for hearing loss or tinnitus    Eyes:    Negative for blurred vision or loss of vision  Respiratory:  Negative for cough or hemoptysis  Cardiac:  Negative for chest pain or palpitations  Vascular:  Negative for claudication, Negative for rest pain  Gastrointestinal: Negative for vomiting or abdominal pain     Negative for hematochezia or melena   Genitourinary: Negative for dysuria or hematuria   Musculoskeletal: Negative for myalgias or acute joint pain  Skin:   Negative for itching or rash  Neurological:  Negative for dizziness or headaches     Negative for speech disturbance     Negative for extremity weakness or paresthesias  Endo/Heme:  Negative for easy bruising or bleeding       Exam:   /58 (BP Location: Left arm, Patient Position: Sitting, BP Cuff Size: Adult)   Pulse 63   Temp 36.3 °C (97.3 °F) (Temporal)   Ht 1.727 m (5' 8\")   Wt 76.2 kg (168 lb)   SpO2 99%   BMI 25.54 kg/m²     Constitutional: Alert, oriented, no acute distress  HEENT:  Normocephalic and atraumatic, EOMI  Neck:   Supple, no JVD,   Cardiovascular: Regular rate and rhythm,   Pulmonary:  Good air entry bilaterally,    Abdominal:  Soft, non-tender, non-distended         Musculoskeletal: No tenderness, no deformity  Neurological:  CN II-XII grossly intact, no focal deficits  Skin:   Skin is warm and dry. No rash noted.  Vascular exam: Palpable radial pulse       Imaging:   IMPRESSION:     1.  Mildly dilated ascending aorta with a maximum diameter of 3.5 cm     2.  Atherosclerosis as described.  Assessment and Plan:   -History of right subclavian artery injury although CTA looks good with respect to this area.  The patient also has equal blood pressures in both upper extremities.  I can find no abnormalities.  Patient does not want any further " work-up or intervention with respect to vascular surgery.  No follow-up necessary.             _____________________________________________________  [facility]  [phone]  [address], [place] [address]

## 2023-04-12 ENCOUNTER — HOSPITAL ENCOUNTER (OUTPATIENT)
Dept: RADIOLOGY | Facility: MEDICAL CENTER | Age: 78
End: 2023-04-12
Attending: FAMILY MEDICINE
Payer: MEDICARE

## 2023-04-12 DIAGNOSIS — M43.16 SPONDYLOLISTHESIS OF LUMBAR REGION: ICD-10-CM

## 2023-04-12 PROCEDURE — 72148 MRI LUMBAR SPINE W/O DYE: CPT

## 2023-05-16 PROBLEM — M75.41 IMPINGEMENT SYNDROME OF RIGHT SHOULDER: Status: ACTIVE | Noted: 2023-05-16

## 2023-05-16 PROBLEM — S46.011A STRAIN OF TENDON OF RIGHT ROTATOR CUFF: Status: ACTIVE | Noted: 2023-05-16

## 2023-05-16 PROBLEM — M75.21 BICIPITAL TENDINITIS OF RIGHT SHOULDER: Status: ACTIVE | Noted: 2023-05-16

## 2023-05-16 NOTE — H&P (VIEW-ONLY)
Date of Service: 23    Chief Complaint:  Pain of the Right Shoulder       History of Present Illness:   This is a 77 y.o. female with continued weakness and decreased function in regards to her right shoulder.  She relaxed a kayak and cannot do so due to her symptoms.  She is pretty frustrated.  She has difficulty lifting her shoulder.    Past Medical History:   Diagnosis Date    Arthritis     Dyslipidemia 2022    Essential hypertension, benign 2022    Subclavian artery stenosis, right (HCC)     In the setting of traumatic dissection in  follows with     Subclavian artery stenosis, right (HCC)        Past Surgical History:   Procedure Laterality Date    HYSTERECTOMY, TOTAL ABDOMINAL      fibroids    APPENDECTOMY      COLONOSCOPY      COLONOSCOPY      neg    HYSTERECTOMY, VAGINAL      PRIMARY C SECTION      x 2    ROTATOR CUFF REPAIR      not repaired just monitored       Family History   Problem Relation Age of Onset    Heart Disease Father     Cancer Sister         breast, age 59    Diabetes Neg Hx     Stroke Neg Hx        Social History     Tobacco Use    Smoking status: Former     Types: Cigarettes     Quit date: 2001     Years since quittin.3    Smokeless tobacco: Never   Substance Use Topics    Alcohol use: Yes     Alcohol/week: 1.2 oz     Types: 2 Glasses of wine per week    Drug use: No        Review of Systems:   In review of the following systems: Constitutional, Eyes, ENT, Cardiovascular,Respiratory, GI, , Musculoskeletal, Skin, Neuro, Psych, Hematologic, Endocrine, Allergic; no pertinent findings were found related to the chief complaint    Height & Weight    23 1313   Weight: 74.8 kg (165 lb)      Body mass index is 25.09 kg/m².     Physical Exam:  General: Well nourished, well developed, age appropriate appearance   HEENT: Normocephalic, atraumatic  Psych: Normal mood and affect  Neck: Supple, no pain to motion  Chest/Pulmonary: breathing unlabored, no  audible wheezing  Cardio: Regular heart rate and rhythm  Neuro: Sensation grossly intact to BUE and BLE, moving all four extremities  Skin: Intact with no full thickness abrasions or lacerations  Musculoskeletal: Examination of her right shoulder shows decreased range of motion actively but passively it is fairly well preserved.  There is pretty significant weakness with resisted supraspinatus testing.  Neurovascular status is intact    Imaging:   MRI shows a massive retracted non repairable supraspinatus tendon tear.  Bicep tendon is torn and resting on the lesser tuberosity.    Assessment:   1.  Right shoulder nonrepairable rotator cuff tendon tear  2.  Bicep tendon tearing approximately    Plan:   We discussed the diagnosis and treatment options at length, including operative and nonoperative intervention. They elected to proceed with surgical intervention. All questions were answered.  I recommend a right shoulder arthroscopy with subacromial decompression, possible bicep tenodesis, balloon arthroplasty, and surgeries as indicated.They were explained the risk, benefits, alternatives, procedure and recovery in detail.  All questions were answered.  Informed consent was obtained.    I have discussed the risks of surgery with the patient and these include but are not limited to anesthetic death, neurovascular complications including brachial plexus, peripheral nerve and vascular injuries, with the possible need for blood transfusion, sensory loss, motor loss, neurapraxia and permanent deficits, pulmonary embolism, deep vein thrombosis, wound dehiscence, failure of any or all of the discussed procedures, infection of the joint or surrounding soft tissue, need for revision surgery, chronic pain, limitations in or changes to activities of daily living, inability to return to work, and loss of normal range of motion or inability to use the joint or extremity in the preoperative manner.    The patient is aware of and  understands these risks, and wishes to proceed with the surgical procedure as discussed. Preoperative instructions were reviewed with the patient. Pre-operative laboratory database will be obtained per anesthesia.          Please note that this dictation was created using voice recognition software.  I have made every reasonable attempt to correct obvious errors, but I expect that there are errors of grammar and possibly content that I did not discover before finalizing the note.

## 2023-05-17 ENCOUNTER — APPOINTMENT (OUTPATIENT)
Dept: ADMISSIONS | Facility: MEDICAL CENTER | Age: 78
End: 2023-05-17
Attending: ORTHOPAEDIC SURGERY
Payer: MEDICARE

## 2023-05-18 ENCOUNTER — PRE-ADMISSION TESTING (OUTPATIENT)
Dept: ADMISSIONS | Facility: MEDICAL CENTER | Age: 78
End: 2023-05-18
Attending: ORTHOPAEDIC SURGERY
Payer: MEDICARE

## 2023-05-18 VITALS — BODY MASS INDEX: 25.09 KG/M2 | HEIGHT: 68 IN

## 2023-05-18 NOTE — PREPROCEDURE INSTRUCTIONS
Pt preadmitted via phone, instructions emailed. Questions answered. Pt instructed to continue regularly prescribed meds through day of procedure. Per anesthesia protocol instructed to take these medications the day of procedure- NA. METs score >4.

## 2023-05-29 ENCOUNTER — HOSPITAL ENCOUNTER (OUTPATIENT)
Dept: CARDIOLOGY | Facility: MEDICAL CENTER | Age: 78
End: 2023-05-29
Attending: FAMILY MEDICINE
Payer: MEDICARE

## 2023-05-29 DIAGNOSIS — R01.0 BENIGN AND INNOCENT CARDIAC MURMURS: ICD-10-CM

## 2023-05-29 LAB
LV EJECT FRACT  99904: 65
LV EJECT FRACT MOD 2C 99903: 70.63
LV EJECT FRACT MOD 4C 99902: 61.93
LV EJECT FRACT MOD BP 99901: 67.07

## 2023-05-29 PROCEDURE — 93306 TTE W/DOPPLER COMPLETE: CPT | Mod: 26 | Performed by: INTERNAL MEDICINE

## 2023-05-29 PROCEDURE — 93306 TTE W/DOPPLER COMPLETE: CPT

## 2023-05-30 ENCOUNTER — APPOINTMENT (OUTPATIENT)
Dept: ADMISSIONS | Facility: MEDICAL CENTER | Age: 78
End: 2023-05-30
Attending: ORTHOPAEDIC SURGERY
Payer: MEDICARE

## 2023-05-30 DIAGNOSIS — Z01.810 PRE-OPERATIVE CARDIOVASCULAR EXAMINATION: ICD-10-CM

## 2023-05-30 LAB — EKG IMPRESSION: NORMAL

## 2023-05-30 PROCEDURE — 93005 ELECTROCARDIOGRAM TRACING: CPT

## 2023-05-30 PROCEDURE — 93010 ELECTROCARDIOGRAM REPORT: CPT | Performed by: INTERNAL MEDICINE

## 2023-06-01 ENCOUNTER — APPOINTMENT (RX ONLY)
Dept: URBAN - METROPOLITAN AREA CLINIC 35 | Facility: CLINIC | Age: 78
Setting detail: DERMATOLOGY
End: 2023-06-01

## 2023-06-01 DIAGNOSIS — L81.8 OTHER SPECIFIED DISORDERS OF PIGMENTATION: ICD-10-CM

## 2023-06-01 DIAGNOSIS — L82.1 OTHER SEBORRHEIC KERATOSIS: ICD-10-CM

## 2023-06-01 DIAGNOSIS — M71 OTHER BURSOPATHIES: ICD-10-CM

## 2023-06-01 DIAGNOSIS — Z71.89 OTHER SPECIFIED COUNSELING: ICD-10-CM

## 2023-06-01 DIAGNOSIS — D22 MELANOCYTIC NEVI: ICD-10-CM

## 2023-06-01 DIAGNOSIS — L81.4 OTHER MELANIN HYPERPIGMENTATION: ICD-10-CM

## 2023-06-01 DIAGNOSIS — D17 BENIGN LIPOMATOUS NEOPLASM: ICD-10-CM

## 2023-06-01 DIAGNOSIS — D18.0 HEMANGIOMA: ICD-10-CM

## 2023-06-01 PROBLEM — D22.72 MELANOCYTIC NEVI OF LEFT LOWER LIMB, INCLUDING HIP: Status: ACTIVE | Noted: 2023-06-01

## 2023-06-01 PROBLEM — D22.62 MELANOCYTIC NEVI OF LEFT UPPER LIMB, INCLUDING SHOULDER: Status: ACTIVE | Noted: 2023-06-01

## 2023-06-01 PROBLEM — D22.39 MELANOCYTIC NEVI OF OTHER PARTS OF FACE: Status: ACTIVE | Noted: 2023-06-01

## 2023-06-01 PROBLEM — D22.61 MELANOCYTIC NEVI OF RIGHT UPPER LIMB, INCLUDING SHOULDER: Status: ACTIVE | Noted: 2023-06-01

## 2023-06-01 PROBLEM — D23.39 OTHER BENIGN NEOPLASM OF SKIN OF OTHER PARTS OF FACE: Status: ACTIVE | Noted: 2023-06-01

## 2023-06-01 PROBLEM — D22.5 MELANOCYTIC NEVI OF TRUNK: Status: ACTIVE | Noted: 2023-06-01

## 2023-06-01 PROBLEM — D22.71 MELANOCYTIC NEVI OF RIGHT LOWER LIMB, INCLUDING HIP: Status: ACTIVE | Noted: 2023-06-01

## 2023-06-01 PROBLEM — D17.22 BENIGN LIPOMATOUS NEOPLASM OF SKIN AND SUBCUTANEOUS TISSUE OF LEFT ARM: Status: ACTIVE | Noted: 2023-06-01

## 2023-06-01 PROBLEM — M71.342 OTHER BURSAL CYST, LEFT HAND: Status: ACTIVE | Noted: 2023-06-01

## 2023-06-01 PROBLEM — D18.01 HEMANGIOMA OF SKIN AND SUBCUTANEOUS TISSUE: Status: ACTIVE | Noted: 2023-06-01

## 2023-06-01 PROCEDURE — ? PHOTO-DOCUMENTATION

## 2023-06-01 PROCEDURE — ? COUNSELING

## 2023-06-01 PROCEDURE — 99213 OFFICE O/P EST LOW 20 MIN: CPT

## 2023-06-01 PROCEDURE — ? ADDITIONAL NOTES

## 2023-06-01 PROCEDURE — ? SUNSCREEN TREATMENT REGIMEN

## 2023-06-01 ASSESSMENT — LOCATION SIMPLE DESCRIPTION DERM
LOCATION SIMPLE: LEFT UPPER BACK
LOCATION SIMPLE: LEFT THIGH
LOCATION SIMPLE: CHEST
LOCATION SIMPLE: INFERIOR FOREHEAD
LOCATION SIMPLE: LEFT UPPER ARM
LOCATION SIMPLE: UPPER BACK
LOCATION SIMPLE: RIGHT THIGH
LOCATION SIMPLE: LEFT MIDDLE FINGER
LOCATION SIMPLE: RIGHT UPPER ARM
LOCATION SIMPLE: LEFT FOREARM
LOCATION SIMPLE: SUPERIOR FOREHEAD
LOCATION SIMPLE: LEFT FOREHEAD
LOCATION SIMPLE: ABDOMEN
LOCATION SIMPLE: LEFT ELBOW

## 2023-06-01 ASSESSMENT — LOCATION ZONE DERM
LOCATION ZONE: TRUNK
LOCATION ZONE: FINGER
LOCATION ZONE: LEG
LOCATION ZONE: ARM
LOCATION ZONE: FACE

## 2023-06-01 ASSESSMENT — LOCATION DETAILED DESCRIPTION DERM
LOCATION DETAILED: LOWER STERNUM
LOCATION DETAILED: INFERIOR MID FOREHEAD
LOCATION DETAILED: LEFT DISTAL DORSAL MIDDLE FINGER
LOCATION DETAILED: RIGHT ANTERIOR DISTAL UPPER ARM
LOCATION DETAILED: LEFT PROXIMAL DORSAL FOREARM
LOCATION DETAILED: LEFT ANTERIOR PROXIMAL THIGH
LOCATION DETAILED: RIGHT ANTERIOR PROXIMAL THIGH
LOCATION DETAILED: LEFT SUPERIOR MEDIAL UPPER BACK
LOCATION DETAILED: EPIGASTRIC SKIN
LOCATION DETAILED: INFERIOR THORACIC SPINE
LOCATION DETAILED: SUPERIOR MID FOREHEAD
LOCATION DETAILED: MIDDLE STERNUM
LOCATION DETAILED: SUPERIOR THORACIC SPINE
LOCATION DETAILED: LEFT MEDIAL FOREHEAD
LOCATION DETAILED: RIGHT ANTERIOR PROXIMAL UPPER ARM
LOCATION DETAILED: RIGHT ANTERIOR DISTAL THIGH
LOCATION DETAILED: STERNAL NOTCH
LOCATION DETAILED: LEFT ANTERIOR DISTAL UPPER ARM
LOCATION DETAILED: LEFT INFERIOR MEDIAL FOREHEAD
LOCATION DETAILED: LEFT ELBOW
LOCATION DETAILED: LEFT MEDIAL UPPER BACK
LOCATION DETAILED: LEFT ANTERIOR DISTAL THIGH
LOCATION DETAILED: LEFT ANTERIOR PROXIMAL UPPER ARM

## 2023-06-01 NOTE — PROCEDURE: ADDITIONAL NOTES
Additional Notes: Patient has been evaluated by hand specialist [facility], they recommend monitoring.  Patient states cyst drained, but the overlying skin took very long time to heal.
Render Risk Assessment In Note?: no
Detail Level: Simple

## 2023-06-14 ENCOUNTER — ANESTHESIA EVENT (OUTPATIENT)
Dept: SURGERY | Facility: MEDICAL CENTER | Age: 78
End: 2023-06-14
Payer: MEDICARE

## 2023-06-14 ENCOUNTER — HOSPITAL ENCOUNTER (OUTPATIENT)
Facility: MEDICAL CENTER | Age: 78
End: 2023-06-14
Attending: ORTHOPAEDIC SURGERY | Admitting: ORTHOPAEDIC SURGERY
Payer: MEDICARE

## 2023-06-14 ENCOUNTER — ANESTHESIA (OUTPATIENT)
Dept: SURGERY | Facility: MEDICAL CENTER | Age: 78
End: 2023-06-14
Payer: MEDICARE

## 2023-06-14 VITALS
DIASTOLIC BLOOD PRESSURE: 55 MMHG | BODY MASS INDEX: 25.78 KG/M2 | TEMPERATURE: 96.8 F | HEART RATE: 58 BPM | OXYGEN SATURATION: 36 % | RESPIRATION RATE: 16 BRPM | WEIGHT: 169.53 LBS | SYSTOLIC BLOOD PRESSURE: 125 MMHG

## 2023-06-14 DIAGNOSIS — M75.41 IMPINGEMENT SYNDROME OF RIGHT SHOULDER: ICD-10-CM

## 2023-06-14 DIAGNOSIS — M75.21 BICIPITAL TENDINITIS OF RIGHT SHOULDER: ICD-10-CM

## 2023-06-14 DIAGNOSIS — S46.011A STRAIN OF TENDON OF RIGHT ROTATOR CUFF, INITIAL ENCOUNTER: ICD-10-CM

## 2023-06-14 PROCEDURE — 29828 SHO ARTHRS SRG BICP TENODSIS: CPT | Mod: ASROC,RT | Performed by: PHYSICIAN ASSISTANT

## 2023-06-14 PROCEDURE — C1776 JOINT DEVICE (IMPLANTABLE): HCPCS | Performed by: ORTHOPAEDIC SURGERY

## 2023-06-14 PROCEDURE — 64415 NJX AA&/STRD BRCH PLXS IMG: CPT | Performed by: ORTHOPAEDIC SURGERY

## 2023-06-14 PROCEDURE — 700111 HCHG RX REV CODE 636 W/ 250 OVERRIDE (IP): Performed by: ANESTHESIOLOGY

## 2023-06-14 PROCEDURE — 700111 HCHG RX REV CODE 636 W/ 250 OVERRIDE (IP): Performed by: PHYSICIAN ASSISTANT

## 2023-06-14 PROCEDURE — 160046 HCHG PACU - 1ST 60 MINS PHASE II: Performed by: ORTHOPAEDIC SURGERY

## 2023-06-14 PROCEDURE — 160035 HCHG PACU - 1ST 60 MINS PHASE I: Performed by: ORTHOPAEDIC SURGERY

## 2023-06-14 PROCEDURE — 29827 SHO ARTHRS SRG RT8TR CUF RPR: CPT | Mod: RT | Performed by: ORTHOPAEDIC SURGERY

## 2023-06-14 PROCEDURE — 29826 SHO ARTHRS SRG DECOMPRESSION: CPT | Mod: ASROC,RT | Performed by: PHYSICIAN ASSISTANT

## 2023-06-14 PROCEDURE — 700105 HCHG RX REV CODE 258: Performed by: ORTHOPAEDIC SURGERY

## 2023-06-14 PROCEDURE — 160002 HCHG RECOVERY MINUTES (STAT): Performed by: ORTHOPAEDIC SURGERY

## 2023-06-14 PROCEDURE — 160009 HCHG ANES TIME/MIN: Performed by: ORTHOPAEDIC SURGERY

## 2023-06-14 PROCEDURE — 160048 HCHG OR STATISTICAL LEVEL 1-5: Performed by: ORTHOPAEDIC SURGERY

## 2023-06-14 PROCEDURE — 700101 HCHG RX REV CODE 250: Performed by: ANESTHESIOLOGY

## 2023-06-14 PROCEDURE — 29827 SHO ARTHRS SRG RT8TR CUF RPR: CPT | Mod: ASROC,RT | Performed by: PHYSICIAN ASSISTANT

## 2023-06-14 PROCEDURE — C1713 ANCHOR/SCREW BN/BN,TIS/BN: HCPCS | Performed by: ORTHOPAEDIC SURGERY

## 2023-06-14 PROCEDURE — 160025 RECOVERY II MINUTES (STATS): Performed by: ORTHOPAEDIC SURGERY

## 2023-06-14 PROCEDURE — 700101 HCHG RX REV CODE 250: Performed by: ORTHOPAEDIC SURGERY

## 2023-06-14 PROCEDURE — 29828 SHO ARTHRS SRG BICP TENODSIS: CPT | Mod: RT | Performed by: ORTHOPAEDIC SURGERY

## 2023-06-14 PROCEDURE — 29823 SHO ARTHRS SRG XTNSV DBRDMT: CPT | Mod: ASROC,RT | Performed by: PHYSICIAN ASSISTANT

## 2023-06-14 PROCEDURE — 29826 SHO ARTHRS SRG DECOMPRESSION: CPT | Mod: RT | Performed by: ORTHOPAEDIC SURGERY

## 2023-06-14 PROCEDURE — 160029 HCHG SURGERY MINUTES - 1ST 30 MINS LEVEL 4: Performed by: ORTHOPAEDIC SURGERY

## 2023-06-14 PROCEDURE — 64415 NJX AA&/STRD BRCH PLXS IMG: CPT | Mod: 59,RT | Performed by: ANESTHESIOLOGY

## 2023-06-14 PROCEDURE — 160041 HCHG SURGERY MINUTES - EA ADDL 1 MIN LEVEL 4: Performed by: ORTHOPAEDIC SURGERY

## 2023-06-14 PROCEDURE — 99100 ANES PT EXTEME AGE<1 YR&>70: CPT | Performed by: ANESTHESIOLOGY

## 2023-06-14 PROCEDURE — 29823 SHO ARTHRS SRG XTNSV DBRDMT: CPT | Mod: RT | Performed by: ORTHOPAEDIC SURGERY

## 2023-06-14 PROCEDURE — 01630 ANES OPN/ARTHR PX SHO JT NOS: CPT | Performed by: ANESTHESIOLOGY

## 2023-06-14 DEVICE — SYSTEM ORTHOSPACE INSPACE MEDIUM HUMERAL SPACER: Type: IMPLANTABLE DEVICE | Site: SHOULDER | Status: FUNCTIONAL

## 2023-06-14 DEVICE — ANCHOR SUTURE 4.75MM ALPHAVENT PEEK 3 STRANDS WITH #2 XBRAID (1EA): Type: IMPLANTABLE DEVICE | Site: SHOULDER | Status: FUNCTIONAL

## 2023-06-14 RX ORDER — SODIUM CHLORIDE, SODIUM LACTATE, POTASSIUM CHLORIDE, CALCIUM CHLORIDE 600; 310; 30; 20 MG/100ML; MG/100ML; MG/100ML; MG/100ML
INJECTION, SOLUTION INTRAVENOUS CONTINUOUS
Status: DISCONTINUED | OUTPATIENT
Start: 2023-06-14 | End: 2023-06-14 | Stop reason: HOSPADM

## 2023-06-14 RX ORDER — ONDANSETRON 2 MG/ML
INJECTION INTRAMUSCULAR; INTRAVENOUS PRN
Status: DISCONTINUED | OUTPATIENT
Start: 2023-06-14 | End: 2023-06-14 | Stop reason: SURG

## 2023-06-14 RX ORDER — ONDANSETRON 2 MG/ML
4 INJECTION INTRAMUSCULAR; INTRAVENOUS
Status: DISCONTINUED | OUTPATIENT
Start: 2023-06-14 | End: 2023-06-14 | Stop reason: HOSPADM

## 2023-06-14 RX ORDER — LIDOCAINE HYDROCHLORIDE 20 MG/ML
INJECTION, SOLUTION EPIDURAL; INFILTRATION; INTRACAUDAL; PERINEURAL PRN
Status: DISCONTINUED | OUTPATIENT
Start: 2023-06-14 | End: 2023-06-14 | Stop reason: SURG

## 2023-06-14 RX ORDER — OXYCODONE HCL 5 MG/5 ML
5 SOLUTION, ORAL ORAL
Status: DISCONTINUED | OUTPATIENT
Start: 2023-06-14 | End: 2023-06-14 | Stop reason: HOSPADM

## 2023-06-14 RX ORDER — CEFAZOLIN SODIUM 1 G/3ML
2 INJECTION, POWDER, FOR SOLUTION INTRAMUSCULAR; INTRAVENOUS ONCE
Status: COMPLETED | OUTPATIENT
Start: 2023-06-14 | End: 2023-06-14

## 2023-06-14 RX ORDER — HALOPERIDOL 5 MG/ML
1 INJECTION INTRAMUSCULAR
Status: DISCONTINUED | OUTPATIENT
Start: 2023-06-14 | End: 2023-06-14 | Stop reason: HOSPADM

## 2023-06-14 RX ORDER — DIPHENHYDRAMINE HYDROCHLORIDE 50 MG/ML
12.5 INJECTION INTRAMUSCULAR; INTRAVENOUS
Status: DISCONTINUED | OUTPATIENT
Start: 2023-06-14 | End: 2023-06-14 | Stop reason: HOSPADM

## 2023-06-14 RX ORDER — EPHEDRINE SULFATE 50 MG/ML
5 INJECTION, SOLUTION INTRAVENOUS
Status: DISCONTINUED | OUTPATIENT
Start: 2023-06-14 | End: 2023-06-14 | Stop reason: HOSPADM

## 2023-06-14 RX ORDER — BUPIVACAINE HYDROCHLORIDE 2.5 MG/ML
INJECTION, SOLUTION EPIDURAL; INFILTRATION; INTRACAUDAL PRN
Status: DISCONTINUED | OUTPATIENT
Start: 2023-06-14 | End: 2023-06-14 | Stop reason: SURG

## 2023-06-14 RX ORDER — ACETAMINOPHEN 325 MG/1
650 TABLET ORAL ONCE
Status: DISCONTINUED | OUTPATIENT
Start: 2023-06-14 | End: 2023-06-14 | Stop reason: HOSPADM

## 2023-06-14 RX ORDER — LABETALOL HYDROCHLORIDE 5 MG/ML
5 INJECTION, SOLUTION INTRAVENOUS
Status: DISCONTINUED | OUTPATIENT
Start: 2023-06-14 | End: 2023-06-14 | Stop reason: HOSPADM

## 2023-06-14 RX ORDER — OXYCODONE HYDROCHLORIDE AND ACETAMINOPHEN 5; 325 MG/1; MG/1
1 TABLET ORAL EVERY 4 HOURS PRN
Qty: 40 TABLET | Refills: 0 | Status: SHIPPED | OUTPATIENT
Start: 2023-06-14 | End: 2023-06-21

## 2023-06-14 RX ORDER — ONDANSETRON 4 MG/1
4 TABLET, FILM COATED ORAL EVERY 4 HOURS PRN
Qty: 12 TABLET | Refills: 0 | Status: SHIPPED | OUTPATIENT
Start: 2023-06-14 | End: 2024-01-05

## 2023-06-14 RX ORDER — BUPIVACAINE HYDROCHLORIDE AND EPINEPHRINE 2.5; 5 MG/ML; UG/ML
INJECTION, SOLUTION EPIDURAL; INFILTRATION; INTRACAUDAL; PERINEURAL
Status: DISCONTINUED | OUTPATIENT
Start: 2023-06-14 | End: 2023-06-14 | Stop reason: HOSPADM

## 2023-06-14 RX ORDER — OXYCODONE HCL 5 MG/5 ML
10 SOLUTION, ORAL ORAL
Status: DISCONTINUED | OUTPATIENT
Start: 2023-06-14 | End: 2023-06-14 | Stop reason: HOSPADM

## 2023-06-14 RX ORDER — LIDOCAINE HYDROCHLORIDE 10 MG/ML
INJECTION, SOLUTION EPIDURAL; INFILTRATION; INTRACAUDAL; PERINEURAL
Status: DISCONTINUED
Start: 2023-06-14 | End: 2023-06-14 | Stop reason: HOSPADM

## 2023-06-14 RX ORDER — DEXAMETHASONE SODIUM PHOSPHATE 4 MG/ML
INJECTION, SOLUTION INTRA-ARTICULAR; INTRALESIONAL; INTRAMUSCULAR; INTRAVENOUS; SOFT TISSUE PRN
Status: DISCONTINUED | OUTPATIENT
Start: 2023-06-14 | End: 2023-06-14 | Stop reason: SURG

## 2023-06-14 RX ORDER — SODIUM CHLORIDE, SODIUM LACTATE, POTASSIUM CHLORIDE, CALCIUM CHLORIDE 600; 310; 30; 20 MG/100ML; MG/100ML; MG/100ML; MG/100ML
INJECTION, SOLUTION INTRAVENOUS CONTINUOUS
Status: ACTIVE | OUTPATIENT
Start: 2023-06-14 | End: 2023-06-14

## 2023-06-14 RX ORDER — HYDRALAZINE HYDROCHLORIDE 20 MG/ML
5 INJECTION INTRAMUSCULAR; INTRAVENOUS
Status: DISCONTINUED | OUTPATIENT
Start: 2023-06-14 | End: 2023-06-14 | Stop reason: HOSPADM

## 2023-06-14 RX ADMIN — LIDOCAINE HYDROCHLORIDE 50 MG: 20 INJECTION, SOLUTION EPIDURAL; INFILTRATION; INTRACAUDAL at 09:09

## 2023-06-14 RX ADMIN — CEFAZOLIN 2 G: 1 INJECTION, POWDER, FOR SOLUTION INTRAMUSCULAR; INTRAVENOUS at 09:11

## 2023-06-14 RX ADMIN — SODIUM CHLORIDE, POTASSIUM CHLORIDE, SODIUM LACTATE AND CALCIUM CHLORIDE: 600; 310; 30; 20 INJECTION, SOLUTION INTRAVENOUS at 08:38

## 2023-06-14 RX ADMIN — BUPIVACAINE HYDROCHLORIDE 15 ML: 2.5 INJECTION, SOLUTION EPIDURAL; INFILTRATION; INTRACAUDAL at 08:55

## 2023-06-14 RX ADMIN — DEXAMETHASONE SODIUM PHOSPHATE 8 MG: 4 INJECTION INTRA-ARTICULAR; INTRALESIONAL; INTRAMUSCULAR; INTRAVENOUS; SOFT TISSUE at 09:11

## 2023-06-14 RX ADMIN — FENTANYL CITRATE 50 MCG: 50 INJECTION, SOLUTION INTRAMUSCULAR; INTRAVENOUS at 09:45

## 2023-06-14 RX ADMIN — ONDANSETRON 4 MG: 2 INJECTION INTRAMUSCULAR; INTRAVENOUS at 10:06

## 2023-06-14 RX ADMIN — PROPOFOL 100 MG: 10 INJECTION, EMULSION INTRAVENOUS at 09:09

## 2023-06-14 ASSESSMENT — PAIN DESCRIPTION - PAIN TYPE: TYPE: CHRONIC PAIN

## 2023-06-14 ASSESSMENT — PAIN SCALES - GENERAL: PAIN_LEVEL: 0

## 2023-06-14 NOTE — ANESTHESIA PROCEDURE NOTES
Peripheral Block    Date/Time: 6/14/2023 8:53 AM    Performed by: Dillon Thompson M.D.  Authorized by: Dillon Thompson M.D.    Patient Location:  Pre-op  Start Time:  6/14/2023 8:53 AM  End Time:  6/14/2023 8:57 AM  Reason for Block: at surgeon's request and post-op pain management ONLY    patient identified, IV checked, site marked, risks and benefits discussed, surgical consent, monitors and equipment checked, pre-op evaluation and timeout performed    Patient Position:  Supine  Prep: ChloraPrep    Monitoring:  Heart rate, continuous pulse ox and cardiac monitor  Block Region:  Upper Extremity  Upper Extremity - Block Type:  BRACHIAL PLEXUS block, Interscalene approach    Laterality:  Right  Procedures: ultrasound guided  Image captured, interpreted and electronically stored.  Local Infiltration:  Lidocaine  Strength:  1 %  Dose:  3 ml  Block Type:  Single-shot  Needle Length:  100mm  Needle Gauge:  21 G  Needle Localization:  Ultrasound guidance  Injection Assessment:  Negative aspiration for heme, no paresthesia on injection, incremental injection and local visualized surrounding nerve on ultrasound  Evidence of intravascular injection: No     US Guided Interscalene Brachial Plexus Block   US transducer placed on the neck in oblique plane approximately at the level of C6.  Anterior and Middle Scalene (MSM) muscles identified with nerve trunks identified between the muscles.  Needle inserted lateral to probe and advanced under direct visualization through the MSM into a perineural position.  After negative aspiration, LA injected with ease and visualized surrounding the nerve trunks.

## 2023-06-14 NOTE — OR NURSING
1142: Pt arrived via wc to stage 2, already dressed. Denies pain and nausea.    1149: Family at chairside. Patient education completed, family denies further questions. DC'd to care of family post uneventful stay in PACU 2. IV discontinued.

## 2023-06-14 NOTE — INTERVAL H&P NOTE
H&P reviewed. The patient was examined and there are no changes to the H&P      The plan is for a right shoulder arthroscopy with possible balloon arthroplasty and biceps surgery as indicated.

## 2023-06-14 NOTE — OR NURSING
1006: To PACU from OR via gurney,  sleeping, respirations spontaneous and non-labored via OPA. Stable on 6L O2 via mask. Ice pack applied over c/d/i right shoulder surgical dressings. RUE CMS intact. RUE immobilizer in place.    1015: OPA out. Stable on 6L O2 via mask    1020: Pt denies pain and nausea. Placed on RA.     1035: Pt resting comfortably. Stable on RA.     1050: Pt denies pain and nausea. Stable on RA. Tolerating po fluids.     1105: Updated pt  via phone. Meets criteria to transfer to Stage 2.    1130:Pt dressed.

## 2023-06-14 NOTE — LETTER
May 18, 2023    Patient Name: Tracy Sam  Surgeon Name: Pepito Johnson M.D.  Surgery Facility: [facility] ([address] [facility])  Surgery Date: 6/14/2023    The time of your surgery is not final and may change up to and until the day of your surgery. You will be contacted 24-48 hours prior to your surgery date with your check-in and surgery time.    If you will not be at one of the below numbers please call the surgery scheduler at [phone]  Preferred Phone: [phone]    BEFORE YOUR SURGERY   Pre Registration and/or Lab Work must be done within and no earlier than 28 days prior to your surgery date. Please call [facility] at [phone] for an appointment as soon as possible.    DAY OF YOUR SURGERY  Nothing to eat or drink after midnight     Refrain from smoking any substance after midnight prior to surgery. Smoking may interfere with the anesthetic and frequently produces nausea during the recovery period.    Continue taking all lifesaving medications. Including the morning of your surgery with small sip of water.    Please do NOT take on the day of surgery:  Diuretics: examples- furosemide (Lasix), spironolactone, hydrochlorothiazide  ACE-inhibitors: examples- lisinopril, ramipril, enalapril  “ARBs”: examples- losartan, Olmesartan, valsartan    Please arrive at the hospital/surgery center at the check-in time provided.     An adult will need to bring you and take you home after your surgery.     AFTER YOUR SURGERY  Post op Appointment:   Date: 06/27/2023   Time: 10:00AM   With: Josh Ferguson PA-C    Location: [address] [place] [facility] [place] [address]    - Therapy- Your first appointment should be 7-10  day(s) after your surgery. For your convenience we have 4 Physical Therapy locations: [facility], [place], and [facility]. Call our office ASAP to schedule an appointment at [phone] or take the enclosed Therapy Prescription  to a facility of your choice.  - Post Surgery - You will need someone to drive you home  - Post Surgery - You will need someone to stay with you for 24 hours  - You must have someone provide transportation post surgery and someone to monitor you for at least 24 hours post-surgery. If you don't have either of these your appointment will be canceled.     TIME OFF WORK  FMLA or Disability forms can be faxed directly to: [phone] or you may drop them off at [address] [place] [address]. Our office charges a $35.00 fee per form. Forms will be completed within 10 business days of drop off and payment received. For the status of your forms you may contact our disability office directly [phone].    MEDICATION INSTRUCTIONS **Please read section completely**    The following medications should be stopped a minimum of 10 days prior to surgery:  All over the counter, Supplements & Herbal medications    Anorectics: Phentermine (Adipex-P, Lomaira and Suprenza), Phentermine-topiramate (Qsymia), Bupropion-naltrexone (Contrave)    Opiod Partial Agonists/Opioid Antagonists: Buprenorphine (Subocone, Belbuca, Butrans, Probuphine Implant, Sublocade), Naltrexone (ReVia, Vivitrol), Naloxone    Amphetamines: Dextroamphetamine/Amphetamine (Adderall, Mydayis), Methylphenidate Hydrochloride (Concerta, Metadate, Methylin, Ritalin)    The following medications should be stopped 5 days prior to surgery:  Blood Thinners: Any Aspirin, Aspirin products, anti-inflammatories such as ibuprofen and any blood thinners such as Coumadin and Plavix. Please consult your prescribing physician if you are on life saving blood thinners, in regards to when to stop medications prior to surgery.     The following medications should be stopped a minimum of 3 days prior to surgery:  PDE-5 inhibitors: Sildenafil (Viagra), Tadalafil (Cialis), Vardenafil (Levitra), Avanafil (Stendra)    MAO Inhibitors: Rasagiline (Azilect), Selegiline (Eldepryl,  Emsam, Selapar), Isocarboxazid (Marplan), Phenelzine (Nardil)         COVID and INFLUENZA NOTICE TO PATIENTS    Currently, the [facility] does not routinely test patients for COVID-19 or Influenza prior to their elective surgery.  However, if patients develop the following symptoms prior to their surgery date, they should voluntarily test for COVID-19 and Influenza and notify the surgical office of their condition and results.  The symptoms warranting testing would be any two of the following:    Fever (Temp above 100.4 F)  Chills  Cough  Shortness of breath or difficulty breathing  Fatigue  Myalgias (muscle or body aches)  Headache  Sore Throat  Congestion or Runny Nose  Nausea or vomiting  Diarrhea  New loss of taste or smell    Having these symptoms prior to surgery can significantly increase your risk of morbidity and mortality under anesthesia, which may compromise your health and require a transfer to a hospital for a higher level of care.  Therefore, it is advisable to notify the surgical team of any illness in order to get information for the appropriate time to delay the surgery to minimize these preventable risks.    Your health and safety are our number one priority at the [facility], and we are thankful that you entrust us with your care.  Please help us ensure the best possible surgical and anesthetic outcome by sharing appropriate health information with our perioperative team and staff.  We look forward to taking great care of you!    Thank you for your time and consideration on this matter.    Memo Ruth MD  Medical Director  Anesthesiologist  [facility]

## 2023-06-14 NOTE — DISCHARGE INSTRUCTIONS
ACTIVITY: Rest and take it easy for the first 24 hours.  A responsible adult is recommended to remain with you during that time.  It is normal to feel sleepy.  We encourage you to not do anything that requires balance, judgment or coordination.    MILD FLU-LIKE SYMPTOMS ARE NORMAL. YOU MAY EXPERIENCE GENERALIZED MUSCLE ACHES, THROAT IRRITATION, HEADACHE AND/OR SOME NAUSEA.    FOR 24 HOURS DO NOT: In  Drive, operate machinery or run household appliances.  Drink beer or alcoholic beverages.   Make important decisions or sign legal documents.    SPECIAL INSTRUCTIONS:     -May remove dressing post op day #2  -Ice and Elevate Extremity  -Follow up with  in 7-10 days    DIET: To avoid nausea, slowly advance diet as tolerated, avoiding spicy or greasy foods for the first day.  Add more substantial food to your diet according to your physician's instructions.  Babies can be fed formula or breast milk as soon as they are hungry.  INCREASE FLUIDS AND FIBER TO AVOID CONSTIPATION.    SURGICAL DRESSING/BATHING:   -Ok to shower on post-op day #2 after dressing removed.   -Shower with wound uncovered. Apply Bandaids after shower.  -Do Not Soak or Submerge incision for 2 weeks  -Maintain Immobilizer in place just about full time. Ok to remove to shower.    FOLLOW-UP APPOINTMENT:  A follow-up appointment should be arranged with your doctor, call to schedule.    You should CALL YOUR PHYSICIAN if you develop:  Fever greater than 101 degrees F.  Pain not relieved by medication, or persistent nausea or vomiting.  Excessive bleeding (blood soaking through dressing) or unexpected drainage from the wound.  Extreme redness or swelling around the incision site, drainage of pus or foul smelling drainage.  Inability to urinate or empty your bladder within 8 hours.  Problems with breathing or chest pain.    You should call 911 if you develop problems with breathing or chest pain.  If you are unable to contact your doctor or surgical  center, you should go to the nearest emergency room or urgent care center.  Physician's telephone #: [phone]    If any questions arise, call your doctor.  If your doctor is not available, please feel free to call the Surgical Center at [phone].     A registered nurse may call you a few days after your surgery to see how you are doing after your procedure.    MEDICATIONS: Resume taking daily medication.  Take prescribed pain medication with food.  If no medication is prescribed, you may take non-aspirin pain medication if needed.  PAIN MEDICATION CAN BE VERY CONSTIPATING.  Take a stool softener or laxative such as senokot, pericolace, or milk of magnesia if needed.    Last pain medication given at None given in Recovery.    If your physician has prescribed pain medication that includes Acetaminophen (Tylenol), do not take additional Acetaminophen (Tylenol) while taking the prescribed medication.        Peripheral Nerve Block Discharge Instructions from Same Day Surgery and Inpatient :    What to Expect - Upper Extremity  You may experience numbness and weakness in shoulder, arm, and hand  on the same side as your surgery  This is normal. For some people, this may be an unpleasant sensation. Be very careful with your numb limb  Ask for help when you need it  Shoulder Surgery Side Effects  In addition to numbness and weakness you may experience other symptoms  Other nerves that are close to those nerves injected can also be affected by local anesthesia  You may experience a hoarseness in your voice  Your breathing may feel different  You may also notice drooping of your eyelid, pupil constriction, and decreased sweating, on the side of your surgery  All of these side effects are normal and will resolve when the local anesthetic wears off   Prevent Injury  Protect the limb like a baby  Beware of exposing your limb to extreme heat or cold or trauma  The limb may be injured without you noticing because it is  "numb  Keep the limb elevated whenever possible  Do not sleep on the limb  Change the position of the limb regularly  Avoid putting pressure on your surgical limb  Pain Control  The initial block on the day of surgery will make your extremity feel \"numb\"  Any consecutive injection including prior to discharge from the hospital will make your extremity feel \"numb\"  You may feel an aching or burning when the local anesthesia starts to wear off  Take pain pills as prescribed by your surgeon  Call your surgeon or anesthesiologist if you do not have adequate pain control    "

## 2023-06-14 NOTE — ANESTHESIA PREPROCEDURE EVALUATION
Case: [id number] Date/Time: 06/14/23 0845    Procedure: Shoulder arthroscopy with subacromial decompression, extensive debridement, bicep tenodesis, balloon placement versus rotator cuff repair, and surgeries as indicated (Right)    Diagnosis:       Strain of tendon of right rotator cuff, initial encounter [S46.011A]      Impingement syndrome of right shoulder [M75.41]      Bicipital tendinitis of right shoulder [M75.21]    Pre-op diagnosis: Strain of tendon of right rotator cuff, initial encounter [S46.011A]    Location:  OR  / SURGERY [facility]    Surgeons: Pepito Johnson M.D.          Relevant Problems   ANESTHESIA (within normal limits)      PULMONARY (within normal limits)      NEURO (within normal limits)      CARDIAC   (positive) Essential hypertension, benign   (positive) Subclavian artery stenosis, right (HCC)      GI (within normal limits)       (within normal limits)      ENDO (within normal limits)      Other   (positive) Bicipital tendinitis of right shoulder   (positive) Impingement syndrome of right shoulder   (positive) Strain of tendon of right rotator cuff       Physical Exam    Airway   Mallampati: II  TM distance: >3 FB  Neck ROM: full       Cardiovascular - normal exam  Rhythm: regular  Rate: normal  (-) murmur     Dental - normal exam           Pulmonary - normal exam  Breath sounds clear to auscultation     Abdominal    Neurological - normal exam                 Anesthesia Plan    ASA 2       Plan - general and peripheral nerve block     Peripheral nerve block will be post-op pain control  Airway plan will be LMA          Induction: intravenous    Postoperative Plan: Postoperative administration of opioids is intended.    Pertinent diagnostic labs and testing reviewed    Informed Consent:    Anesthetic plan and risks discussed with patient.    Use of blood products discussed with: patient whom consented to blood products.

## 2023-06-14 NOTE — ANESTHESIA PROCEDURE NOTES
Airway    Date/Time: 6/14/2023 9:10 AM    Performed by: Dillon Thompson M.D.  Authorized by: Dillon Thompson M.D.    Location:  OR  Urgency:  Elective  Indications for Airway Management:  Anesthesia      Spontaneous Ventilation: absent    Sedation Level:  Deep  Preoxygenated: Yes    Final Airway Type:  Supraglottic airway  Final Supraglottic Airway:  Standard LMA    SGA Size:  4  Number of Attempts at Approach:  1

## 2023-06-14 NOTE — DISCHARGE INSTR - OTHER INFO
May remove dressings Post op Day #2 and Shower with wound uncovered.  Apply bandaids after shower.  Do not soak or submerge incisions for two weeks. Ice and elevate extremity. Follow up 7-10 days.    Maintain immobilizer near full time, may remove for shower

## 2023-06-14 NOTE — ANESTHESIA TIME REPORT
Anesthesia Start and Stop Event Times     Date Time Event    6/14/2023 0823 Ready for Procedure     0904 Anesthesia Start     1009 Anesthesia Stop        Responsible Staff  06/14/23    Name Role Begin End    Dillon Thompson M.D. Anesth 0904 1009        Overtime Reason:  no overtime (within assigned shift)    Comments:

## 2023-06-14 NOTE — OP REPORT
DATE OF OPERATION:  6/14/2023    SURGEON:  Pepito Johnson MD    ASSISTANT:  KALYANI Najera    PREOPERATIVE DIAGNOSES:  1.  Right shoulder massive rotator cuff tear.  2.  Right shoulder subacromial impingement.    POSTOPERATIVE DIAGNOSES:  1.  Right shoulder massive rotator cuff tear.  2.  Right shoulder long head biceps tendon tear.  3.  Right shoulder subacromial impingement.  4.  Right shoulder SLAP tear.  5.  Right shoulder partial thickness superior fiber subscapularis tear.  6.  Right shoulder chondromalacia.    PROCEDURE:  1.  Right shoulder arthroscopy with rotator cuff repair and inspace balloon arthroplasty.  2.  Right shoulder arthroscopy with biceps tenodesis.  3.  Right shoulder arthroscopy with subacromial decompression.  4.  Right shoulder arthroscopy with extensive debridement of the glenohumeral   joint.    ANESTHESIOLOGIST: Shamar Thompson MD    ANESTHETIC:  General endotracheal anesthesia along with interscalene block for postoperative pain control.    INDICATIONS:  The patient has had shoulder pain for quite some time.  She  has failed nonoperative treatment.  The patient elected to proceed with operative   intervention.  The patient was explained the risks, benefits, alternative procedures   and recovery in detail.  All questions were answered, informed consent was   obtained.  Site verification per protocol was done in the preop holding area   and the operating room.  Patient received appropriate preoperative   antibiotics.    OPERATION:  After successful anesthesia, the patient was placed in modified beach   chair position.  All bony prominences were well padded and supported.  The patient's head   and neck were maintained in a neutral position.  The eyes were protected. The shoulder was then examined.  The shoulder had good range of motion, no evidence of instability.  The arm was prepped and   draped in the usual sterile fashion.  A posterior portal was established with a  knife and blunt  trocar into the glenohumeral space.  A diagnostic arthroscopy   revealed chondromalacia on the humeral head with early blistering and some minor flaps.  The glenoid also had some early chondromalacia on the anterior inferior aspect.  These areas were gently debrided with a shaver. The anterior, posterior and superior labrum had some moderate  degenerative fraying that was gently debrided from anterior and posterior portals.  The biceps was flattened and   split.  A tenotomy was performed for a later tenodesis. The subscapularis had some superior fiber fraying.  This was gently debrided with a shaver.  It was structural to visual inspection and probing. The undersurface of the rotator cuff had a full-thickness retracted tear with some degeneration.  At that point, I lavaged out the joint and moved the trocar into the subacromial space. I then performed a thorough bursectomy and acromioplasty from multiple portals with a shaver and resector, leveling out the acromion to a type 1.  At that point, I focused my attention to the biceps tendon.  I pulled it out of the groove, roughened up the groove, placed 2 running locking sutures into it and then tenodesed it to a Francis alpha triple loaded anchor.  The remainder of the tendon was cut off and removed.  The rotator cuff a large retracted supraspinatus tear that was not good-quality or repairable.  The infraspinatus was mobile and able to be pulled up.  I elected to repair the infraspinatus.  I roughened up the footprint leaving the cortical shell and placed a Francis alpha triple loaded anchor.  The sutures were spaced evenly.  These were all tied securely arthroscopically.  This pulled the posterior rotator cuff up onto the tuberosity well.     At that point elected to do a Francis in space balloon for the supraspinatus deficiency.  I measured to a size medium.  The medium was inserted without difficulty.  I inflated it to 24 mL of saline and backed it down to 16.  The  end of the balloon was sealed.  It was in good position and stable to range of motion. I lavaged out the joint, suctioned out the fluid, closed the portals with 3-0 Prolene in interrupted fashion.  Xeroform, 4x4s, Medipore tape and abduction sling were applied.  Patient was   transferred to recovery room in stable condition.    ESTIMATED BLOOD LOSS:  Minimal.    COMPLICATIONS:  None.    COMPONENTS USED:  2 Francis alpha 3 and 1 Francis Inspace balloon.    Nitin Ferguson PA-C, was medically necessary for the case.  He helped with   instrumentation, retraction, and position.  Without his help, the case would   not have been as technically successful.

## 2023-06-14 NOTE — ANESTHESIA POSTPROCEDURE EVALUATION
Patient: Tracy Sam    Procedure Summary     Date: 06/14/23 Room / Location:  OR  / SURGERY [facility]    Anesthesia Start: 0904 Anesthesia Stop: 1009    Procedure: Shoulder arthroscopy with subacromial decompression, extensive debridement, bicep tenodesis, balloon placement,  rotator cuff repair (Right: Shoulder) Diagnosis:       Strain of tendon of right rotator cuff, initial encounter      Impingement syndrome of right shoulder      Bicipital tendinitis of right shoulder      (Strain of tendon of right rotator cuff, initial encounter [S46.011A])    Surgeons: Pepito Johnson M.D. Responsible Provider: Dillon Thompson M.D.    Anesthesia Type: general, peripheral nerve block ASA Status: 2          Final Anesthesia Type: general, peripheral nerve block  Last vitals  BP   Blood Pressure : 125/55    Temp   36 °C (96.8 °F)    Pulse   (!) 58   Resp   16    SpO2   (!) 36 %      Anesthesia Post Evaluation    Patient location during evaluation: PACU  Patient participation: complete - patient participated  Level of consciousness: awake and alert  Pain score: 0    Airway patency: patent  Anesthetic complications: no  Cardiovascular status: hemodynamically stable  Respiratory status: acceptable  Hydration status: euvolemic    PONV: none          No notable events documented.     Nurse Pain Score: 0 (NPRS)

## 2023-11-29 ENCOUNTER — PATIENT MESSAGE (OUTPATIENT)
Dept: HEALTH INFORMATION MANAGEMENT | Facility: OTHER | Age: 78
End: 2023-11-29

## 2023-12-14 ENCOUNTER — APPOINTMENT (OUTPATIENT)
Dept: MEDICAL GROUP | Facility: MEDICAL CENTER | Age: 78
End: 2023-12-14
Payer: MEDICARE

## 2024-02-28 ENCOUNTER — HOSPITAL ENCOUNTER (OUTPATIENT)
Dept: RADIOLOGY | Facility: MEDICAL CENTER | Age: 79
End: 2024-02-28
Attending: FAMILY MEDICINE
Payer: MEDICARE

## 2024-02-28 DIAGNOSIS — Z12.31 SCREENING MAMMOGRAM, ENCOUNTER FOR: ICD-10-CM

## 2024-02-28 PROCEDURE — 77067 SCR MAMMO BI INCL CAD: CPT

## 2024-04-09 ENCOUNTER — HOSPITAL ENCOUNTER (OUTPATIENT)
Dept: LAB | Facility: MEDICAL CENTER | Age: 79
End: 2024-04-09
Attending: FAMILY MEDICINE
Payer: MEDICARE

## 2024-04-09 LAB
ALBUMIN SERPL BCP-MCNC: 4.3 G/DL (ref 3.2–4.9)
ALBUMIN/GLOB SERPL: 1.3 G/DL
ALP SERPL-CCNC: 81 U/L (ref 30–99)
ALT SERPL-CCNC: 12 U/L (ref 2–50)
ANION GAP SERPL CALC-SCNC: 12 MMOL/L (ref 7–16)
APPEARANCE UR: CLEAR
AST SERPL-CCNC: 22 U/L (ref 12–45)
BACTERIA #/AREA URNS HPF: NEGATIVE /HPF
BASOPHILS # BLD AUTO: 1.2 % (ref 0–1.8)
BASOPHILS # BLD: 0.06 K/UL (ref 0–0.12)
BILIRUB SERPL-MCNC: 0.4 MG/DL (ref 0.1–1.5)
BILIRUB UR QL STRIP.AUTO: NEGATIVE
BUN SERPL-MCNC: 12 MG/DL (ref 8–22)
CALCIUM ALBUM COR SERPL-MCNC: 9.3 MG/DL (ref 8.5–10.5)
CALCIUM SERPL-MCNC: 9.5 MG/DL (ref 8.5–10.5)
CHLORIDE SERPL-SCNC: 100 MMOL/L (ref 96–112)
CO2 SERPL-SCNC: 26 MMOL/L (ref 20–33)
COLOR UR: YELLOW
CREAT SERPL-MCNC: 0.64 MG/DL (ref 0.5–1.4)
EOSINOPHIL # BLD AUTO: 0.14 K/UL (ref 0–0.51)
EOSINOPHIL NFR BLD: 2.8 % (ref 0–6.9)
EPI CELLS #/AREA URNS HPF: NEGATIVE /HPF
ERYTHROCYTE [DISTWIDTH] IN BLOOD BY AUTOMATED COUNT: 44.3 FL (ref 35.9–50)
GFR SERPLBLD CREATININE-BSD FMLA CKD-EPI: 90 ML/MIN/1.73 M 2
GLOBULIN SER CALC-MCNC: 3.2 G/DL (ref 1.9–3.5)
GLUCOSE SERPL-MCNC: 95 MG/DL (ref 65–99)
GLUCOSE UR STRIP.AUTO-MCNC: NEGATIVE MG/DL
HCT VFR BLD AUTO: 43.9 % (ref 37–47)
HGB BLD-MCNC: 14.6 G/DL (ref 12–16)
HYALINE CASTS #/AREA URNS LPF: NORMAL /LPF
IMM GRANULOCYTES # BLD AUTO: 0.01 K/UL (ref 0–0.11)
IMM GRANULOCYTES NFR BLD AUTO: 0.2 % (ref 0–0.9)
KETONES UR STRIP.AUTO-MCNC: NEGATIVE MG/DL
LEUKOCYTE ESTERASE UR QL STRIP.AUTO: ABNORMAL
LYMPHOCYTES # BLD AUTO: 1.22 K/UL (ref 1–4.8)
LYMPHOCYTES NFR BLD: 24.8 % (ref 22–41)
MCH RBC QN AUTO: 32.7 PG (ref 27–33)
MCHC RBC AUTO-ENTMCNC: 33.3 G/DL (ref 32.2–35.5)
MCV RBC AUTO: 98.4 FL (ref 81.4–97.8)
MICRO URNS: ABNORMAL
MONOCYTES # BLD AUTO: 0.54 K/UL (ref 0–0.85)
MONOCYTES NFR BLD AUTO: 11 % (ref 0–13.4)
NEUTROPHILS # BLD AUTO: 2.95 K/UL (ref 1.82–7.42)
NEUTROPHILS NFR BLD: 60 % (ref 44–72)
NITRITE UR QL STRIP.AUTO: NEGATIVE
NRBC # BLD AUTO: 0 K/UL
NRBC BLD-RTO: 0 /100 WBC (ref 0–0.2)
PH UR STRIP.AUTO: 7 [PH] (ref 5–8)
PLATELET # BLD AUTO: 262 K/UL (ref 164–446)
PMV BLD AUTO: 8.9 FL (ref 9–12.9)
POTASSIUM SERPL-SCNC: 4.2 MMOL/L (ref 3.6–5.5)
PROT SERPL-MCNC: 7.5 G/DL (ref 6–8.2)
PROT UR QL STRIP: NEGATIVE MG/DL
RBC # BLD AUTO: 4.46 M/UL (ref 4.2–5.4)
RBC # URNS HPF: NORMAL /HPF
RBC UR QL AUTO: NEGATIVE
SODIUM SERPL-SCNC: 138 MMOL/L (ref 135–145)
SP GR UR STRIP.AUTO: 1.01
TSH SERPL DL<=0.005 MIU/L-ACNC: 2.18 UIU/ML (ref 0.38–5.33)
UROBILINOGEN UR STRIP.AUTO-MCNC: 0.2 MG/DL
WBC # BLD AUTO: 4.9 K/UL (ref 4.8–10.8)
WBC #/AREA URNS HPF: NORMAL /HPF

## 2024-04-09 PROCEDURE — 81001 URINALYSIS AUTO W/SCOPE: CPT

## 2024-04-09 PROCEDURE — 84443 ASSAY THYROID STIM HORMONE: CPT

## 2024-04-09 PROCEDURE — 80053 COMPREHEN METABOLIC PANEL: CPT

## 2024-04-09 PROCEDURE — 85025 COMPLETE CBC W/AUTO DIFF WBC: CPT

## 2024-04-09 PROCEDURE — 36415 COLL VENOUS BLD VENIPUNCTURE: CPT

## 2024-04-11 ENCOUNTER — HOSPITAL ENCOUNTER (OUTPATIENT)
Dept: RADIOLOGY | Facility: MEDICAL CENTER | Age: 79
End: 2024-04-11
Attending: FAMILY MEDICINE
Payer: MEDICARE

## 2024-04-11 DIAGNOSIS — N95.2 POSTMENOPAUSAL ATROPHIC VAGINITIS: ICD-10-CM

## 2024-04-11 DIAGNOSIS — R31.29 OTHER MICROSCOPIC HEMATURIA: ICD-10-CM

## 2024-04-11 DIAGNOSIS — N39.0 URINARY TRACT INFECTION WITHOUT HEMATURIA, SITE UNSPECIFIED: ICD-10-CM

## 2024-04-11 DIAGNOSIS — R19.4 CHANGE IN BOWEL HABITS: ICD-10-CM

## 2024-04-11 PROCEDURE — 74177 CT ABD & PELVIS W/CONTRAST: CPT

## 2024-04-11 PROCEDURE — 700117 HCHG RX CONTRAST REV CODE 255: Performed by: FAMILY MEDICINE

## 2024-04-11 RX ADMIN — IOHEXOL 100 ML: 350 INJECTION, SOLUTION INTRAVENOUS at 13:44

## 2024-04-11 RX ADMIN — IOHEXOL 25 ML: 240 INJECTION, SOLUTION INTRATHECAL; INTRAVASCULAR; INTRAVENOUS; ORAL at 13:45

## 2024-04-12 ENCOUNTER — APPOINTMENT (OUTPATIENT)
Dept: RADIOLOGY | Facility: MEDICAL CENTER | Age: 79
End: 2024-04-12
Attending: FAMILY MEDICINE
Payer: MEDICARE

## 2024-06-06 ENCOUNTER — APPOINTMENT (RX ONLY)
Dept: URBAN - METROPOLITAN AREA CLINIC 35 | Facility: CLINIC | Age: 79
Setting detail: DERMATOLOGY
End: 2024-06-06

## 2024-06-06 DIAGNOSIS — D17 BENIGN LIPOMATOUS NEOPLASM: ICD-10-CM

## 2024-06-06 DIAGNOSIS — D18.0 HEMANGIOMA: ICD-10-CM

## 2024-06-06 DIAGNOSIS — L82.1 OTHER SEBORRHEIC KERATOSIS: ICD-10-CM

## 2024-06-06 DIAGNOSIS — D22 MELANOCYTIC NEVI: ICD-10-CM

## 2024-06-06 DIAGNOSIS — Z71.89 OTHER SPECIFIED COUNSELING: ICD-10-CM

## 2024-06-06 DIAGNOSIS — L57.0 ACTINIC KERATOSIS: ICD-10-CM

## 2024-06-06 DIAGNOSIS — L81.8 OTHER SPECIFIED DISORDERS OF PIGMENTATION: ICD-10-CM

## 2024-06-06 DIAGNOSIS — L81.4 OTHER MELANIN HYPERPIGMENTATION: ICD-10-CM

## 2024-06-06 PROBLEM — D22.39 MELANOCYTIC NEVI OF OTHER PARTS OF FACE: Status: ACTIVE | Noted: 2024-06-06

## 2024-06-06 PROBLEM — D22.61 MELANOCYTIC NEVI OF RIGHT UPPER LIMB, INCLUDING SHOULDER: Status: ACTIVE | Noted: 2024-06-06

## 2024-06-06 PROBLEM — D18.01 HEMANGIOMA OF SKIN AND SUBCUTANEOUS TISSUE: Status: ACTIVE | Noted: 2024-06-06

## 2024-06-06 PROBLEM — D22.71 MELANOCYTIC NEVI OF RIGHT LOWER LIMB, INCLUDING HIP: Status: ACTIVE | Noted: 2024-06-06

## 2024-06-06 PROBLEM — D22.62 MELANOCYTIC NEVI OF LEFT UPPER LIMB, INCLUDING SHOULDER: Status: ACTIVE | Noted: 2024-06-06

## 2024-06-06 PROBLEM — D22.72 MELANOCYTIC NEVI OF LEFT LOWER LIMB, INCLUDING HIP: Status: ACTIVE | Noted: 2024-06-06

## 2024-06-06 PROBLEM — D17.22 BENIGN LIPOMATOUS NEOPLASM OF SKIN AND SUBCUTANEOUS TISSUE OF LEFT ARM: Status: ACTIVE | Noted: 2024-06-06

## 2024-06-06 PROBLEM — D23.39 OTHER BENIGN NEOPLASM OF SKIN OF OTHER PARTS OF FACE: Status: ACTIVE | Noted: 2024-06-06

## 2024-06-06 PROBLEM — D22.5 MELANOCYTIC NEVI OF TRUNK: Status: ACTIVE | Noted: 2024-06-06

## 2024-06-06 PROCEDURE — 99213 OFFICE O/P EST LOW 20 MIN: CPT | Mod: 25

## 2024-06-06 PROCEDURE — ? LIQUID NITROGEN

## 2024-06-06 PROCEDURE — ? COUNSELING

## 2024-06-06 PROCEDURE — ? PHOTO-DOCUMENTATION

## 2024-06-06 PROCEDURE — 17000 DESTRUCT PREMALG LESION: CPT

## 2024-06-06 PROCEDURE — ? SUNSCREEN TREATMENT REGIMEN

## 2024-06-06 ASSESSMENT — LOCATION DETAILED DESCRIPTION DERM
LOCATION DETAILED: LEFT MEDIAL FOREHEAD
LOCATION DETAILED: NASAL SUPRATIP
LOCATION DETAILED: LEFT ANTERIOR PROXIMAL UPPER ARM
LOCATION DETAILED: LEFT PROXIMAL DORSAL FOREARM
LOCATION DETAILED: SUPERIOR MID FOREHEAD
LOCATION DETAILED: LEFT ANTERIOR PROXIMAL THIGH
LOCATION DETAILED: LOWER STERNUM
LOCATION DETAILED: LEFT ANTERIOR DISTAL THIGH
LOCATION DETAILED: STERNAL NOTCH
LOCATION DETAILED: INFERIOR MID FOREHEAD
LOCATION DETAILED: LEFT LATERAL INFERIOR EYELID
LOCATION DETAILED: MIDDLE STERNUM
LOCATION DETAILED: RIGHT ANTERIOR PROXIMAL UPPER ARM
LOCATION DETAILED: RIGHT ANTERIOR DISTAL UPPER ARM
LOCATION DETAILED: INFERIOR THORACIC SPINE
LOCATION DETAILED: EPIGASTRIC SKIN
LOCATION DETAILED: RIGHT ANTERIOR PROXIMAL THIGH
LOCATION DETAILED: LEFT ELBOW
LOCATION DETAILED: LEFT MEDIAL UPPER BACK
LOCATION DETAILED: LEFT ANTERIOR DISTAL UPPER ARM
LOCATION DETAILED: LEFT SUPERIOR MEDIAL UPPER BACK
LOCATION DETAILED: LEFT INFERIOR MEDIAL FOREHEAD
LOCATION DETAILED: SUPERIOR THORACIC SPINE
LOCATION DETAILED: RIGHT ANTERIOR DISTAL THIGH

## 2024-06-06 ASSESSMENT — LOCATION SIMPLE DESCRIPTION DERM
LOCATION SIMPLE: CHEST
LOCATION SIMPLE: LEFT ELBOW
LOCATION SIMPLE: SUPERIOR FOREHEAD
LOCATION SIMPLE: RIGHT UPPER ARM
LOCATION SIMPLE: LEFT UPPER ARM
LOCATION SIMPLE: INFERIOR FOREHEAD
LOCATION SIMPLE: LEFT UPPER BACK
LOCATION SIMPLE: RIGHT THIGH
LOCATION SIMPLE: LEFT INFERIOR EYELID
LOCATION SIMPLE: NOSE
LOCATION SIMPLE: ABDOMEN
LOCATION SIMPLE: LEFT THIGH
LOCATION SIMPLE: LEFT FOREHEAD
LOCATION SIMPLE: LEFT FOREARM
LOCATION SIMPLE: UPPER BACK

## 2024-06-06 ASSESSMENT — LOCATION ZONE DERM
LOCATION ZONE: EYELID
LOCATION ZONE: LEG
LOCATION ZONE: FACE
LOCATION ZONE: NOSE
LOCATION ZONE: ARM
LOCATION ZONE: TRUNK

## 2024-06-06 NOTE — PROCEDURE: LIQUID NITROGEN
Detail Level: Detailed
Show Aperture Variable?: Yes
Consent: The patient's consent was obtained including but not limited to risks of crusting, scabbing, blistering, scarring, darker or lighter pigmentary change, recurrence, incomplete removal and infection.
Render Post-Care Instructions In Note?: no
Number Of Freeze-Thaw Cycles: 1 freeze-thaw cycle
Duration Of Freeze Thaw-Cycle (Seconds): 10
Post-Care Instructions: I reviewed with the patient in detail post-care instructions. Patient is to wear sunprotection, and avoid picking at any of the treated lesions. Pt may apply Vaseline to crusted or scabbing areas.

## 2024-06-06 NOTE — HPI: FULL BODY SKIN EXAMINATION
PROBLEM LIST:  1. 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  Mammogram 1/7/20 showed a 4.1 cm mass in the right breast, with 2 adjacent smaller masses.  1.9 cm right axillary lymph node.   FNA of lymph node negative.  Biopsy of right breast mass showed grade 2 IDC, Er 95%, AR 2%, Her2 3+.  B) neoadjuvant chemotherapy with TCHP started 2/5/2020  C) right mastectomy on 6/23/20.  Pathology showed a 2 x 1 x 0.6 cm intermediate grade IDC.  0/2 SLN involved.  [id number]  D) adjuvant Kadcyla started 7/22/2020.   Kadcyla stopped October 14, 2020 and switched back to Herceptin and Perjeta due to progressive severe neuropathy.  E) anastrozole with Lupron started 08/12/2020.   2. PCOS  3. Anxiety/depression  4. DM2  5. GERD  6. Hyperlipidemia  7. Hypertension  8.  Hidradenitis  9.  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  CT chest 1/19/2021 showed a pathologic nondisplaced fracture of the right seventh rib.  There were also subtle small nodules in the lungs, none larger than 4 mm.  PET/CT on 1/28/2021 showed SUV of 4.8 in the right rib fracture.  Pulmonary micronodules too small to register hypermetabolic activity.  Biopsy of the rib lesion 2/24/2021 showed Langerhans' cell histiocytosis.  Smoking cessation strongly recommended and she did stop smoking in February 2021.  11. Bilateral pulmonary emboli 11/16/2022 3 days following breast reconstruction surgery.  Currently on Eliquis 5 mg twice daily.    Subjective     CHIEF COMPLAINT: breast cancer    HISTORY OF PRESENT ILLNESS:   Josefina Andino Michael returns for follow-up.  She continues on anastrozole and Lupron injections.     She is trying to lose weight and get her A1c down so that she is able to have the abdominal hernia repair.    She has started to have some right-sided rib pain again, similar to what 
What Is The Reason For Today's Visit?: Full Body Skin Examination
"she had when she was diagnosed with Langerhans.        Objective      /78   Pulse 98   Temp 97 °F (36.1 °C)   Resp 16   Ht 167.6 cm (65.98\")   Wt 103 kg (228 lb)   SpO2 97%   BMI 36.82 kg/m²    Vitals:    03/21/24 0939   PainSc: 0-No pain               Performance Status: 0    General: well appearing female in no acute distress  Neuro: alert and oriented  HEENT: sclera anicteric  Pulmonary: Lungs are clear to auscultation bilaterally  Cardiovascular: Regular rate and rhythm no murmurs  Chest: status post right mastectomy with flap reconstruction.  Left breast is unremarkable.  Abdomen: Large ventral hernia  Extremeties: no lower extremity edema  Skin: no rashes, lesions, bruising, or petechiae  Psych: mood and affect appropriate      RECENT LABS:  Lab Results   Component Value Date    WBC 10.22 10/05/2023    HGB 13.1 10/05/2023    HCT 40.9 10/05/2023    MCV 88.5 10/05/2023     10/05/2023     Lab Results   Component Value Date    GLUCOSE 222 (H) 10/05/2023    BUN 12 10/05/2023    CREATININE 0.69 10/05/2023    EGFRIFNONA 105 12/02/2021    EGFRIFAFRI 108 02/29/2024    BCR 17.4 10/05/2023    K 4.1 10/05/2023    CO2 24.0 10/05/2023    CALCIUM 9.5 10/05/2023    ALBUMIN 3.8 10/05/2023    AST 15 10/05/2023    ALT 19 10/05/2023              Assessment & Plan   Josefina Rodriguez is a 47 y.o. year old female with stage IB ER+ Her2+ IDC of the right breast, as well as a diagnosis of Langerhans histiocytosis involving the lung as well as a single rib lesion.    Breast cancer: She continues on anastrozole along with Lupron for ovarian suppression. We will continue Lupron for the duration of her endocrine therapy treatment.  No evidence of recurrence at this time.      Langerhans histiocytosis: Imaging changes resolved with cessation of smoking.  She is having new rib pain which is similar to her prior presentation.  She continues to avoid cigarettes.  I will repeat a high-res CT of the chest.  We "
What Is The Reason For Today's Visit? (Being Monitored For X): concerning skin lesions on a periodic basis
will call her with results.    Peripheral neuropathy: Continue Lyrica.     Bone density January 27th 2023 was normal.  Plan to repeat in 2 years.    She will be due for a left breast screening mammogram January 2024.  Order placed today.    Pulmonary embolism following surgery:  She will continue aspirin.    Follow-up in 6 months.              Jolene Connell MD  [facility]    3/21/2024          CC:

## 2024-06-06 NOTE — PROCEDURE: PHOTO-DOCUMENTATION
Details (Free Text): Lesion left eyelid photographed in the past, has resolved
Detail Level: Detailed
Photo Preface (Leave Blank If You Do Not Want): Photographs were obtained today

## 2024-12-06 ENCOUNTER — OFFICE VISIT (OUTPATIENT)
Dept: CARDIOLOGY | Facility: MEDICAL CENTER | Age: 79
End: 2024-12-06
Payer: MEDICARE

## 2024-12-06 VITALS
RESPIRATION RATE: 15 BRPM | DIASTOLIC BLOOD PRESSURE: 56 MMHG | SYSTOLIC BLOOD PRESSURE: 132 MMHG | WEIGHT: 165 LBS | BODY MASS INDEX: 25.01 KG/M2 | HEART RATE: 71 BPM | OXYGEN SATURATION: 98 % | HEIGHT: 68 IN

## 2024-12-06 DIAGNOSIS — I77.1 SUBCLAVIAN ARTERY STENOSIS, RIGHT (HCC): ICD-10-CM

## 2024-12-06 DIAGNOSIS — R01.0 BENIGN AND INNOCENT CARDIAC MURMURS: ICD-10-CM

## 2024-12-06 DIAGNOSIS — E78.5 DYSLIPIDEMIA: ICD-10-CM

## 2024-12-06 PROCEDURE — 99211 OFF/OP EST MAY X REQ PHY/QHP: CPT

## 2024-12-06 PROCEDURE — 3075F SYST BP GE 130 - 139MM HG: CPT

## 2024-12-06 PROCEDURE — 99214 OFFICE O/P EST MOD 30 MIN: CPT

## 2024-12-06 PROCEDURE — 3078F DIAST BP <80 MM HG: CPT

## 2024-12-06 ASSESSMENT — ENCOUNTER SYMPTOMS
NEAR-SYNCOPE: 0
DIZZINESS: 0
ORTHOPNEA: 0
SYNCOPE: 0
DYSPNEA ON EXERTION: 0
COUGH: 1
LIGHT-HEADEDNESS: 0
PALPITATIONS: 0
PND: 0
SHORTNESS OF BREATH: 0
HEADACHES: 0

## 2024-12-06 ASSESSMENT — FIBROSIS 4 INDEX: FIB4 SCORE: 1.91

## 2024-12-06 NOTE — PROGRESS NOTES
"Chief Complaint   Patient presents with    Hypertension          Subjective:   Tracy Sam is a 79 y.o. female who presents today for follow-up.     Patient of Dr. Jacobo.  Current medical problems include HTN, HLD, and PVD. Their last clinic visit was 7/29/2022 with Dr. Jacobo.      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 There are no clinical sign of acute ischemia at present and there is an excellent chance that the artery will remodel over time. I have placed her on clopidogrel and she should be followed up regularly. If the condition of the arm worsens, she could be considered for PTA and stenting, alternatively axillary bypass, although I do not foresee this happening. We did discuss the options of performing myocardial perfusion imaging study, echocardiogram, however, she does not wish to schedule this procedure at this time.     Today's visit:  Patient reports that she is doing overall well from a cardiac perspective. She is here just for routine follow up as she had not been seen in two years but has no cardiac complaints. She has no chest pain, shortness of breath, edema, orthopnea, PND, dizziness/lightheadedness, or palpitations. She walks 3-5 miles most days pending on the weather. She takes her blood pressure at home on occasion to monitor and it is always well controlled in the 120s. She has stopped eating gluten and that has helped with her reflux and cough. She does get costochondritis due to chronic cough but she is followed by PCP and sees a chiropractor. She is very active and travels with her .    Cardiovascular Risk Factors:  1. Smoking status: Former smoker  2. Type II Diabetes Mellitus: No No results found for: \"HBA1C\"  3. Hypertension: No  4. Dyslipidemia: Yes   Cholesterol,Tot   Date Value Ref Range Status   07/15/2022 192 " 100 - 199 mg/dL Final     LDL   Date Value Ref Range Status   07/15/2022 118 (H) <100 mg/dL Final     HDL   Date Value Ref Range Status   07/15/2022 59 >=40 mg/dL Final     Triglycerides   Date Value Ref Range Status   07/15/2022 76 0 - 149 mg/dL Final     5. Family history of early Coronary Artery Disease in a first degree relative (Male less than 55 years of age; Female less than 65 years of age): No  6.  Obesity and/or Metabolic Syndrome: No  7. Sedentary lifestyle: No    Past Medical History:   Diagnosis Date    Arthritis     pt denies    Bronchitis     remote hx    Cataract     Dental disorder     4 implants upper right    Dyslipidemia 2022    Essential hypertension, benign 2022    Pain 2023    R shoulder pain    PVD (peripheral vascular disease) (HCC)     Subclavian artery stenosis, right (HCC)     In the setting of traumatic dissection in  follows with     Urinary incontinence     controlled with exercises         Family History   Problem Relation Age of Onset    Heart Disease Father     Cancer Sister         breast, age 59    Diabetes Neg Hx     Stroke Neg Hx          Social History     Tobacco Use    Smoking status: Former     Current packs/day: 0.00     Types: Cigarettes     Quit date: 2001     Years since quittin.9    Smokeless tobacco: Never   Vaping Use    Vaping status: Never Used   Substance Use Topics    Alcohol use: Yes     Alcohol/week: 1.2 oz     Types: 2 Glasses of wine per week    Drug use: No         No Known Allergies      Current Outpatient Medications   Medication Sig    Omega-3 Fatty Acids (FISH OIL PO) Take 1 Capsule by mouth every day.    TURMERIC PO Take 1 Tablet by mouth every day.    GLUCOSAMINE-CHONDROITIN PO Take 1 Tablet by mouth every day.    therapeutic multivitamin-minerals (THERAGRAN-M) Tab Take 1 Tablet by mouth every day.         Review of Systems   Constitutional: Negative for malaise/fatigue.   Cardiovascular:  Negative for chest pain, dyspnea on  "exertion, leg swelling, near-syncope, orthopnea, palpitations, paroxysmal nocturnal dyspnea and syncope.   Respiratory:  Positive for cough. Negative for shortness of breath.    Neurological:  Negative for dizziness, headaches and light-headedness.           Objective:   /56 (BP Location: Left arm, Patient Position: Sitting, BP Cuff Size: Adult)   Pulse 71   Resp 15   Ht 1.727 m (5' 8\")   Wt 74.8 kg (165 lb)   SpO2 98%  Body mass index is 25.09 kg/m².         Physical Exam  Vitals reviewed.   Constitutional:       General: She is not in acute distress.     Appearance: Normal appearance.   HENT:      Head: Normocephalic and atraumatic.   Cardiovascular:      Rate and Rhythm: Normal rate and regular rhythm.      Pulses: Normal pulses.      Heart sounds: Murmur heard.   Pulmonary:      Effort: Pulmonary effort is normal. No respiratory distress.      Breath sounds: Normal breath sounds.   Musculoskeletal:      Right lower leg: No edema.      Left lower leg: No edema.   Neurological:      Mental Status: She is alert and oriented to person, place, and time.      Gait: Gait normal.   Psychiatric:         Behavior: Behavior normal.             Lab Results   Component Value Date/Time    SODIUM 138 04/09/2024 09:07 AM    POTASSIUM 4.2 04/09/2024 09:07 AM    CHLORIDE 100 04/09/2024 09:07 AM    CO2 26 04/09/2024 09:07 AM    GLUCOSE 95 04/09/2024 09:07 AM    BUN 12 04/09/2024 09:07 AM    CREATININE 0.64 04/09/2024 09:07 AM      Lab Results   Component Value Date/Time    WBC 4.9 04/09/2024 09:07 AM    RBC 4.46 04/09/2024 09:07 AM    HEMOGLOBIN 14.6 04/09/2024 09:07 AM    HEMATOCRIT 43.9 04/09/2024 09:07 AM    MCV 98.4 (H) 04/09/2024 09:07 AM    MCH 32.7 04/09/2024 09:07 AM    MCHC 33.3 04/09/2024 09:07 AM    MPV 8.9 (L) 04/09/2024 09:07 AM    NEUTSPOLYS 60.00 04/09/2024 09:07 AM    LYMPHOCYTES 24.80 04/09/2024 09:07 AM    MONOCYTES 11.00 04/09/2024 09:07 AM    EOSINOPHILS 2.80 04/09/2024 09:07 AM    BASOPHILS 1.20 " "04/09/2024 09:07 AM      Lab Results   Component Value Date/Time    CHOLSTRLTOT 192 07/15/2022 10:09 AM     (H) 07/15/2022 10:09 AM    HDL 59 07/15/2022 10:09 AM    TRIGLYCERIDE 76 07/15/2022 10:09 AM       No results found for: \"TROPONINT\"  No results found for: \"NTPROBNP\"  Assessment:   1. Dyslipidemia  - Lipid Profile; Future    2. Benign and innocent cardiac murmurs    3. Subclavian artery stenosis, right (HCC)        Medical Decision Making:  Today's Assessment / Plan:   Hyperlipidemia  -Most recent   -Not currently on statin therapy which is appropriate for her age  -Goal of less than 100  -Check lipid panel in 3 months    Mitral regurgitation  Murmur  -Echocardiogram reviewed from 2023 and showed mild mitral regurgitation. Patient denies any symptoms. Surveillance echocardiogram as needed    Subclavian artery stenosis  -Stable, patient not having any concerns and does not wish for any further follow up at this time.     Return in about 1 year (around 12/6/2025) for Christie STINSON.  Sooner if problems.    Christie Miranda A.P.R.N.   "

## 2025-01-18 ENCOUNTER — OFFICE VISIT (OUTPATIENT)
Dept: URGENT CARE | Facility: CLINIC | Age: 80
End: 2025-01-18
Payer: MEDICARE

## 2025-01-18 VITALS
WEIGHT: 176 LBS | RESPIRATION RATE: 16 BRPM | DIASTOLIC BLOOD PRESSURE: 60 MMHG | TEMPERATURE: 97.8 F | BODY MASS INDEX: 26.67 KG/M2 | HEART RATE: 63 BPM | OXYGEN SATURATION: 97 % | SYSTOLIC BLOOD PRESSURE: 132 MMHG | HEIGHT: 68 IN

## 2025-01-18 DIAGNOSIS — M94.0 COSTOCHONDRITIS: ICD-10-CM

## 2025-01-18 DIAGNOSIS — R07.82 INTERCOSTAL PAIN: ICD-10-CM

## 2025-01-18 DIAGNOSIS — J98.01 COLD INDUCED BRONCHOSPASM: ICD-10-CM

## 2025-01-18 PROCEDURE — 99214 OFFICE O/P EST MOD 30 MIN: CPT

## 2025-01-18 RX ORDER — CYCLOBENZAPRINE HCL 10 MG
10 TABLET ORAL NIGHTLY PRN
Qty: 5 TABLET | Refills: 0 | Status: SHIPPED | OUTPATIENT
Start: 2025-01-18 | End: 2025-01-23

## 2025-01-18 ASSESSMENT — ENCOUNTER SYMPTOMS
MYALGIAS: 0
IRREGULAR HEARTBEAT: 0
DIAPHORESIS: 0
COUGH: 1
EXERTIONAL CHEST PRESSURE: 0
HEADACHES: 0
FOCAL WEAKNESS: 0
CLAUDICATION: 0
BLURRED VISION: 0
FEVER: 0
SHORTNESS OF BREATH: 0
LEG PAIN: 0
PALPITATIONS: 0
HEMOPTYSIS: 0
VOMITING: 0
WEAKNESS: 0
DIZZINESS: 0
FALLS: 0
ORTHOPNEA: 0
CHILLS: 0
ABDOMINAL PAIN: 0
BACK PAIN: 1
NUMBNESS: 0
PND: 0
SPUTUM PRODUCTION: 0
NAUSEA: 0
LOWER EXTREMITY EDEMA: 0
SYNCOPE: 0
NEAR-SYNCOPE: 0
DIARRHEA: 0
LOSS OF CONSCIOUSNESS: 0

## 2025-01-18 ASSESSMENT — FIBROSIS 4 INDEX: FIB4 SCORE: 1.91

## 2025-01-18 NOTE — PROGRESS NOTES
Subjective:     Tracy Sam is a 79 y.o. female who presents for Chest Pain (Sx gotten worse 1 day ago, Pressure on chest-pt stated, headaches on going, fatigue)      Chest Pain   This is a new problem. The current episode started 1 to 4 weeks ago. The onset quality is gradual. The problem occurs intermittently. The problem has been waxing and waning. The pain is present in the epigastric region, lateral region and substernal region. The pain is at a severity of 5/10. The pain is moderate. The quality of the pain is described as sharp, pressure and heavy. The pain does not radiate. Associated symptoms include back pain and a cough. Pertinent negatives include no abdominal pain, claudication, diaphoresis, dizziness, exertional chest pressure, fever, headaches, hemoptysis, irregular heartbeat, leg pain, lower extremity edema, malaise/fatigue, nausea, near-syncope, numbness, orthopnea, palpitations, PND, shortness of breath, sputum production, syncope, vomiting or weakness. Cough associated with: cold air. The cough is relieved by humidity. Treatments tried: life style mods. Prior workup: EKG.       Review of Systems   Constitutional:  Negative for chills, diaphoresis, fever and malaise/fatigue.   HENT:  Negative for ear discharge.    Eyes:  Negative for blurred vision.   Respiratory:  Positive for cough. Negative for hemoptysis, sputum production and shortness of breath.    Cardiovascular:  Positive for chest pain. Negative for palpitations, orthopnea, claudication, leg swelling, syncope, PND and near-syncope.   Gastrointestinal:  Negative for abdominal pain, diarrhea, nausea and vomiting.   Musculoskeletal:  Positive for back pain. Negative for falls and myalgias.   Skin:  Negative for itching and rash.   Neurological:  Negative for dizziness, focal weakness, loss of consciousness, weakness, numbness and headaches.        CURRENT MEDICATIONS:  FISH OIL PO  GLUCOSAMINE-CHONDROITIN PO  therapeutic  "multivitamin-minerals Tabs  TURMERIC PO    Allergies:   No Known Allergies    Current Problems: Tracy Sam does not have any pertinent problems on file.  Past Surgical Hx:    Past Surgical History:   Procedure Laterality Date    PB SHLDR ARTHROSCOP,SURG,W/ROTAT CUFF REPB Right 6/14/2023    Procedure: Shoulder arthroscopy with subacromial decompression, extensive debridement, bicep tenodesis, balloon placement,  rotator cuff repair;  Surgeon: Pepito Johnson M.D.;  Location: SURGERY [facility];  Service: Orthopedics    HYSTERECTOMY, TOTAL ABDOMINAL  1990    fibroids    APPENDECTOMY      CATARACT EXTRACTION WITH IOL Bilateral     COLONOSCOPY      COLONOSCOPY  2098    neg    PRIMARY C SECTION      x 2    TONSILLECTOMY        Past Social Hx:  reports that she quit smoking about 24 years ago. Her smoking use included cigarettes. She has never used smokeless tobacco. She reports current alcohol use of about 1.2 oz of alcohol per week. She reports that she does not use drugs.   Past Family Hx:  Tracy Sam family history includes Cancer in her sister; Heart Disease in her father.     (Allergies, Medications, & Tobacco/Substance Use were reconciled by the Medical Assistant and reviewed by myself. The family history is prepopulated)       Objective:     /60   Pulse 63   Temp 36.6 °C (97.8 °F) (Temporal)   Resp 16   Ht 1.727 m (5' 8\")   Wt 79.8 kg (176 lb)   SpO2 97%   BMI 26.76 kg/m²     Physical Exam  Constitutional:       General: She is not in acute distress.     Appearance: Normal appearance. She is not ill-appearing, toxic-appearing or diaphoretic.   HENT:      Head: Normocephalic and atraumatic.      Nose: Nose normal.   Eyes:      General: No scleral icterus.        Right eye: No discharge.         Left eye: No discharge.   Cardiovascular:      Rate and Rhythm: Normal rate and regular rhythm.   Pulmonary:      Effort: Pulmonary effort is normal.   Abdominal:      General: Abdomen " is flat.      Palpations: Abdomen is soft.   Musculoskeletal:         General: Normal range of motion.      Cervical back: No rigidity.   Skin:     General: Skin is warm and dry.   Neurological:      General: No focal deficit present.      Mental Status: She is alert and oriented to person, place, and time. Mental status is at baseline.   Psychiatric:         Behavior: Behavior normal.         Judgment: Judgment normal.         Assessment/Plan:   Tracy was seen today for chest pain.    Diagnoses and all orders for this visit:    Intercostal pain  -     EKG - Clinic Performed  -     cyclobenzaprine (FLEXERIL) 10 mg Tab; Take 1 Tablet by mouth at bedtime as needed for Muscle Spasms (pain) for up to 5 days.    Costochondritis  -     cyclobenzaprine (FLEXERIL) 10 mg Tab; Take 1 Tablet by mouth at bedtime as needed for Muscle Spasms (pain) for up to 5 days.    Cold induced bronchospasm    Based on history of presenting illness, review of systems and physical exam findings, most likely etiology of chest pain is costochondritis secondary to bronchospasm that is cold air induced.  Discussed symptomatic management with pharmacotherapy and over-the-counter medications.  ACS rule out EKG reassuring.  Discussed the risks the benefits and the indications of all new medications prescribed today.  Strict return and ED precautions were discussed and all questions were answered.     Differential diagnosis, natural history, supportive care, and indications for immediate follow-up discussed.    Advised the patient to follow-up with the primary care physician for recheck, reevaluation, and consideration of further management.    Please note that this dictation was created using voice recognition software. I have made reasonable attempt to correct obvious errors, but I expect that there are errors of grammar and possibly content that I did not discover before finalizing the note.    This note was electronically signed by Anita  MD Yeyo PhD

## 2025-02-11 ENCOUNTER — GYNECOLOGY VISIT (OUTPATIENT)
Dept: OBGYN | Facility: CLINIC | Age: 80
End: 2025-02-11
Payer: MEDICARE

## 2025-02-11 VITALS
SYSTOLIC BLOOD PRESSURE: 140 MMHG | HEIGHT: 68 IN | BODY MASS INDEX: 26.83 KG/M2 | HEART RATE: 58 BPM | WEIGHT: 177 LBS | DIASTOLIC BLOOD PRESSURE: 73 MMHG

## 2025-02-11 DIAGNOSIS — Z90.710 H/O TOTAL HYSTERECTOMY: ICD-10-CM

## 2025-02-11 DIAGNOSIS — R39.81 FUNCTIONAL URINARY INCONTINENCE: ICD-10-CM

## 2025-02-11 DIAGNOSIS — R15.9 INCONTINENCE OF FECES, UNSPECIFIED FECAL INCONTINENCE TYPE: ICD-10-CM

## 2025-02-11 DIAGNOSIS — N39.46 MIXED STRESS AND URGE URINARY INCONTINENCE: ICD-10-CM

## 2025-02-11 PROCEDURE — 3078F DIAST BP <80 MM HG: CPT | Performed by: STUDENT IN AN ORGANIZED HEALTH CARE EDUCATION/TRAINING PROGRAM

## 2025-02-11 PROCEDURE — 3077F SYST BP >= 140 MM HG: CPT | Performed by: STUDENT IN AN ORGANIZED HEALTH CARE EDUCATION/TRAINING PROGRAM

## 2025-02-11 PROCEDURE — 99204 OFFICE O/P NEW MOD 45 MIN: CPT | Performed by: STUDENT IN AN ORGANIZED HEALTH CARE EDUCATION/TRAINING PROGRAM

## 2025-02-11 RX ORDER — ESTRADIOL 0.1 MG/G
CREAM VAGINAL
Qty: 42.5 G | Refills: 3 | Status: SHIPPED | OUTPATIENT
Start: 2025-02-11

## 2025-02-11 ASSESSMENT — FIBROSIS 4 INDEX: FIB4 SCORE: 1.91

## 2025-02-11 NOTE — PROGRESS NOTES
Pt presents for GYN visit (pelvic pain)   Last seen on: 1/25/21 [place]   Phone: [phone]   Pharmacy verified   Sexually active: yes  BCM: Hysterectomy (done in her 40s)  Last PAP: 15 years ago no hx of any abnormal  Mammo: 2/28/24 fibroglandular density but otherwise negative (rec. 1 year F/U)  Dexa: 2/19/21 wnl  Colonoscopy: ?  Pt states she has incontinence, first had in 2011 she did pelvic floor PT but she just started within the last year where she was not aware that she was urinating and she has also noticed loose bowel movements.

## 2025-02-11 NOTE — PROGRESS NOTES
GYN PROBLEM VISIT    CC:  Gynecologic Exam (Pelvic Pain)       HPI: Patient is a 79 y.o. , who presents to clinic today for a referral to UroGynecology due to worsening fecal and urinary incontinence.     She states that she first began experiencing urinary incontinence in  and did pelvic floor PT (at [facility]) and noticed improvement of symptoms at that time. However, in the past year, she noticed her urinary incontinence has worsened and is now experiencing fetal incontinence as well. She states she is unaware she has to go to the bathroom until she's noticed that her underwear is wet.     Patient denies dysuria, flank/abd pain, denies blood in urine or stool. She states she had a colonoscopy and abdominal CT w/contrast done in [place] within the past 6-7 years, they informed her she had diverticulosis but otherwise normal.  Patient reports that her doctors in [place] did a CT in addition to the colonoscopy due to significant scar tissue/her surgical history that prevents them from doing a thorough colonoscopy. Prior to that, she had a colon[facility] done with [facility] and was told by them that she no longer needed screening.     Her previous PCP had prescribed her with vaginal estrogen cream last year, she used it for 4 months and found it to be helpful with some of her urinary incontinence. She stopped using it as she ran out of it and PCP no longer there, she desires refill of this.     She had a total hysterectomy in  (when she was around 45yo), unsure reason and a bilateral oophorectomy done several years later. Patient states she was on HRT for many years and stopped in 2016. Denies any vaginal bleeding, pelvic/abd pain.      ROS:   General: denies fever / chills  HEENT: denies sore throat:  CV: reports of chest pain related to costochondritis  Repiratory: denies shortness of breath  GI: denies abdominal pain  : denies dysuria:    PFSH:  I personally reviewed  "the past medical and surgical histories.     Social History     Tobacco Use    Smoking status: Former     Current packs/day: 0.00     Types: Cigarettes     Quit date: 2001     Years since quittin.1    Smokeless tobacco: Never   Vaping Use    Vaping status: Never Used   Substance Use Topics    Alcohol use: Yes     Alcohol/week: 0.6 oz     Types: 1 Glasses of wine per week    Drug use: Never        Social History     Substance and Sexual Activity   Sexual Activity Yes    Partners: Male    Birth control/protection: None        ALLERGIES / REACTIONS:  No Known Allergies                      Patient Active Problem List    Diagnosis Date Noted    Strain of tendon of right rotator cuff 2023    Impingement syndrome of right shoulder 2023    Bicipital tendinitis of right shoulder 2023    Essential hypertension, benign 2022    Dyslipidemia 2022    PVD [facility]peripheral vascular disease) [facility] 2022    Digital mucous cyst of left hand 2021    Subclavian artery stenosis, right ([facility])     Osteoarthritis 2009       PHYSICAL EXAMINATION:  Vital Signs:   BP (!) 140/73 (BP Location: Right arm, Patient Position: Sitting, BP Cuff Size: Adult)   Pulse (!) 58   Ht 5' 8\"   Wt 177 lb   BMI 26.91 kg/m²     Gen: appears well, NAD  Respiratory: normal effort  Abdomen: Soft, non-tender.  Pelvic Exam: Declined    ASSESSMENT AND PLAN:  79 y.o.  here for referral to UroGyn due to worsening fecal and urinary incontinence for the past year.     Assessment & Plan  Mixed stress and urge urinary incontinence  - Referral sent to Pelvic Floor PT and UroGyn  - Rx sent for vaginal estrogen cream, as patient tolerated well in the past and found mild improvement of symptoms while on it.   Orders:    Referral to Physical Therapy    Referral to Urogynecology    estradiol (ESTRACE VAGINAL) 0.1 MG/GM vaginal cream; Apply 1g cream inside vagina using applicator nightly for 2 weeks, then twice per week " thereafter    Functional urinary incontinence  - Referral sent to Pelvic Floor PT and UroGyn  Orders:    Referral to Physical Therapy    Referral to Urogynecology    Incontinence of feces, unspecified fecal incontinence type  - Referral sent to pelvic floor PT and UroGyn  Orders:    Referral to Physical Therapy    Referral to Urogynecology    H/O total hysterectomy  - Per patient, she had total hysterectomy in 1990 (43yo), unsure reason and a bilateral oophorectomy done several years later. Patient states she was on HRT for many years and stopped in 2016.           Gerri Peterson P.A.-C.

## 2025-02-11 NOTE — ASSESSMENT & PLAN NOTE
- Per patient, she had total hysterectomy in 1990 (43yo), unsure reason and a bilateral oophorectomy done several years later. Patient states she was on HRT for many years and stopped in 2016.

## 2025-02-12 NOTE — Clinical Note
REFERRAL APPROVAL NOTICE         Sent on February 12, 2025                   Tracy Sam  [address] [place] [address]                   Dear Ms. Sam,    After a careful review of the medical information and benefit coverage, [facility] has processed your referral. See below for additional details.    If applicable, you must be actively enrolled with your insurance for coverage of the authorized service. If you have any questions regarding your coverage, please contact your insurance directly.    REFERRAL INFORMATION   Referral #:  [id number]  Referred-To Provider    Referred-By Provider:  Physical Therapy    Gerri Peterson P.A.-C.   [facility]      [address]  [address]  [place] [address]  [phone] [id number]  [place] [address]  [phone]    Referral Start Date:  02/11/2025  Referral End Date:   02/12/2026             SCHEDULING  If you do not already have an appointment, please call [phone] to make an appointment.     MORE INFORMATION  If you do not already have a [employer] account, sign up at: [email or web address]  You can access your medical information, make appointments, see lab results, billing information, and more.  If you have questions regarding this referral, please contact  the [facility] Referrals department at:             [phone]. Monday - Friday 8:00AM - 5:00PM.     Sincerely,    [facility]

## 2025-02-12 NOTE — Clinical Note
REFERRAL APPROVAL NOTICE         Sent on February 12, 2025                   Tracy Rosa Cecy  [address] [place] [address]                   Dear Ms. Sam,    After a careful review of the medical information and benefit coverage, [facility] has processed your referral. See below for additional details.    If applicable, you must be actively enrolled with your insurance for coverage of the authorized service. If you have any questions regarding your coverage, please contact your insurance directly.    REFERRAL INFORMATION   Referral #:  [id number]  Referred-To Department    Referred-By Provider:  Gynecology    Gerri Peterson P.A.-C.   [facility]      [address]  [address]  [place] [address] [phone] [address]  [place] [id number] [phone]    Referral Start Date:  02/11/2025  Referral End Date:   02/11/2026             SCHEDULING  If you do not already have an appointment, please call [phone] to make an appointment.     MORE INFORMATION  If you do not already have a Bio account, sign up at: [email or web address]  You can access your medical information, make appointments, see lab results, billing information, and more.  If you have questions regarding this referral, please contact  the [facility] Referrals department at:             [phone]. Monday - Friday 8:00AM - 5:00PM.     Sincerely,    [facility]

## 2025-03-24 SDOH — HEALTH STABILITY: PHYSICAL HEALTH: ON AVERAGE, HOW MANY MINUTES DO YOU ENGAGE IN EXERCISE AT THIS LEVEL?: 60 MIN

## 2025-03-24 SDOH — ECONOMIC STABILITY: FOOD INSECURITY: WITHIN THE PAST 12 MONTHS, YOU WORRIED THAT YOUR FOOD WOULD RUN OUT BEFORE YOU GOT MONEY TO BUY MORE.: NEVER TRUE

## 2025-03-24 SDOH — ECONOMIC STABILITY: INCOME INSECURITY: HOW HARD IS IT FOR YOU TO PAY FOR THE VERY BASICS LIKE FOOD, HOUSING, MEDICAL CARE, AND HEATING?: NOT HARD AT ALL

## 2025-03-24 SDOH — ECONOMIC STABILITY: TRANSPORTATION INSECURITY
IN THE PAST 12 MONTHS, HAS THE LACK OF TRANSPORTATION KEPT YOU FROM MEDICAL APPOINTMENTS OR FROM GETTING MEDICATIONS?: NO

## 2025-03-24 SDOH — ECONOMIC STABILITY: INCOME INSECURITY: IN THE LAST 12 MONTHS, WAS THERE A TIME WHEN YOU WERE NOT ABLE TO PAY THE MORTGAGE OR RENT ON TIME?: NO

## 2025-03-24 SDOH — ECONOMIC STABILITY: TRANSPORTATION INSECURITY
IN THE PAST 12 MONTHS, HAS LACK OF TRANSPORTATION KEPT YOU FROM MEETINGS, WORK, OR FROM GETTING THINGS NEEDED FOR DAILY LIVING?: NO

## 2025-03-24 SDOH — HEALTH STABILITY: PHYSICAL HEALTH: ON AVERAGE, HOW MANY DAYS PER WEEK DO YOU ENGAGE IN MODERATE TO STRENUOUS EXERCISE (LIKE A BRISK WALK)?: 3 DAYS

## 2025-03-24 SDOH — ECONOMIC STABILITY: FOOD INSECURITY: WITHIN THE PAST 12 MONTHS, THE FOOD YOU BOUGHT JUST DIDN'T LAST AND YOU DIDN'T HAVE MONEY TO GET MORE.: NEVER TRUE

## 2025-03-24 SDOH — ECONOMIC STABILITY: TRANSPORTATION INSECURITY
IN THE PAST 12 MONTHS, HAS LACK OF RELIABLE TRANSPORTATION KEPT YOU FROM MEDICAL APPOINTMENTS, MEETINGS, WORK OR FROM GETTING THINGS NEEDED FOR DAILY LIVING?: NO

## 2025-03-24 SDOH — ECONOMIC STABILITY: HOUSING INSECURITY
IN THE LAST 12 MONTHS, WAS THERE A TIME WHEN YOU DID NOT HAVE A STEADY PLACE TO SLEEP OR SLEPT IN A SHELTER (INCLUDING NOW)?: NO

## 2025-03-24 SDOH — HEALTH STABILITY: MENTAL HEALTH
STRESS IS WHEN SOMEONE FEELS TENSE, NERVOUS, ANXIOUS, OR CAN'T SLEEP AT NIGHT BECAUSE THEIR MIND IS TROUBLED. HOW STRESSED ARE YOU?: NOT AT ALL

## 2025-03-24 ASSESSMENT — LIFESTYLE VARIABLES
SKIP TO QUESTIONS 9-10: 1
AUDIT-C TOTAL SCORE: 1
HOW MANY STANDARD DRINKS CONTAINING ALCOHOL DO YOU HAVE ON A TYPICAL DAY: 1 OR 2
HOW OFTEN DO YOU HAVE SIX OR MORE DRINKS ON ONE OCCASION: NEVER
HOW OFTEN DO YOU HAVE A DRINK CONTAINING ALCOHOL: MONTHLY OR LESS

## 2025-03-24 ASSESSMENT — SOCIAL DETERMINANTS OF HEALTH (SDOH)
HOW OFTEN DO YOU ATTENT MEETINGS OF THE CLUB OR ORGANIZATION YOU BELONG TO?: PATIENT DECLINED
HOW OFTEN DO YOU HAVE A DRINK CONTAINING ALCOHOL: MONTHLY OR LESS
HOW OFTEN DO YOU GET TOGETHER WITH FRIENDS OR RELATIVES?: PATIENT DECLINED
HOW OFTEN DO YOU ATTEND CHURCH OR RELIGIOUS SERVICES?: PATIENT DECLINED
HOW OFTEN DO YOU HAVE SIX OR MORE DRINKS ON ONE OCCASION: NEVER
IN A TYPICAL WEEK, HOW MANY TIMES DO YOU TALK ON THE PHONE WITH FAMILY, FRIENDS, OR NEIGHBORS?: MORE THAN THREE TIMES A WEEK
HOW OFTEN DO YOU ATTENT MEETINGS OF THE CLUB OR ORGANIZATION YOU BELONG TO?: PATIENT DECLINED
IN A TYPICAL WEEK, HOW MANY TIMES DO YOU TALK ON THE PHONE WITH FAMILY, FRIENDS, OR NEIGHBORS?: MORE THAN THREE TIMES A WEEK
DO YOU BELONG TO ANY CLUBS OR ORGANIZATIONS SUCH AS CHURCH GROUPS UNIONS, FRATERNAL OR ATHLETIC GROUPS, OR SCHOOL GROUPS?: PATIENT DECLINED
IN THE PAST 12 MONTHS, HAS THE ELECTRIC, GAS, OIL, OR WATER COMPANY THREATENED TO SHUT OFF SERVICE IN YOUR HOME?: NO
HOW OFTEN DO YOU ATTEND CHURCH OR RELIGIOUS SERVICES?: PATIENT DECLINED
HOW MANY DRINKS CONTAINING ALCOHOL DO YOU HAVE ON A TYPICAL DAY WHEN YOU ARE DRINKING: 1 OR 2
WITHIN THE PAST 12 MONTHS, YOU WORRIED THAT YOUR FOOD WOULD RUN OUT BEFORE YOU GOT THE MONEY TO BUY MORE: NEVER TRUE
HOW OFTEN DO YOU GET TOGETHER WITH FRIENDS OR RELATIVES?: PATIENT DECLINED
DO YOU BELONG TO ANY CLUBS OR ORGANIZATIONS SUCH AS CHURCH GROUPS UNIONS, FRATERNAL OR ATHLETIC GROUPS, OR SCHOOL GROUPS?: PATIENT DECLINED
HOW HARD IS IT FOR YOU TO PAY FOR THE VERY BASICS LIKE FOOD, HOUSING, MEDICAL CARE, AND HEATING?: NOT HARD AT ALL

## 2025-03-27 ENCOUNTER — OFFICE VISIT (OUTPATIENT)
Dept: MEDICAL GROUP | Facility: PHYSICIAN GROUP | Age: 80
End: 2025-03-27
Payer: MEDICARE

## 2025-03-27 VITALS
RESPIRATION RATE: 14 BRPM | WEIGHT: 177.91 LBS | DIASTOLIC BLOOD PRESSURE: 84 MMHG | OXYGEN SATURATION: 97 % | BODY MASS INDEX: 26.96 KG/M2 | SYSTOLIC BLOOD PRESSURE: 130 MMHG | HEIGHT: 68 IN | TEMPERATURE: 98.5 F | HEART RATE: 64 BPM

## 2025-03-27 DIAGNOSIS — E78.5 DYSLIPIDEMIA: ICD-10-CM

## 2025-03-27 DIAGNOSIS — Z13.9 ENCOUNTER FOR SCREENING: ICD-10-CM

## 2025-03-27 DIAGNOSIS — M94.0 COSTOCHONDRITIS: ICD-10-CM

## 2025-03-27 DIAGNOSIS — R89.9 ABNORMAL LABORATORY TEST: ICD-10-CM

## 2025-03-27 DIAGNOSIS — I77.1 SUBCLAVIAN ARTERY STENOSIS, RIGHT (HCC): ICD-10-CM

## 2025-03-27 PROBLEM — M67.442 DIGITAL MUCOUS CYST OF LEFT HAND: Status: RESOLVED | Noted: 2021-08-04 | Resolved: 2025-03-27

## 2025-03-27 PROBLEM — I73.9 PVD (PERIPHERAL VASCULAR DISEASE) (HCC): Status: RESOLVED | Noted: 2022-07-29 | Resolved: 2025-03-27

## 2025-03-27 PROBLEM — I10 ESSENTIAL HYPERTENSION, BENIGN: Status: RESOLVED | Noted: 2022-07-29 | Resolved: 2025-03-27

## 2025-03-27 PROCEDURE — 3079F DIAST BP 80-89 MM HG: CPT

## 2025-03-27 PROCEDURE — 3075F SYST BP GE 130 - 139MM HG: CPT

## 2025-03-27 PROCEDURE — 99204 OFFICE O/P NEW MOD 45 MIN: CPT

## 2025-03-27 ASSESSMENT — FIBROSIS 4 INDEX: FIB4 SCORE: 1.91

## 2025-03-27 ASSESSMENT — ENCOUNTER SYMPTOMS
COUGH: 0
HEADACHES: 0
CONSTIPATION: 0
WEIGHT LOSS: 0
BACK PAIN: 0
INSOMNIA: 0
DIARRHEA: 0
DEPRESSION: 0
HEARTBURN: 0

## 2025-03-27 ASSESSMENT — PATIENT HEALTH QUESTIONNAIRE - PHQ9: CLINICAL INTERPRETATION OF PHQ2 SCORE: 0

## 2025-03-27 NOTE — ASSESSMENT & PLAN NOTE
-Chronic, stable.  Patient's age is outside of ASCVD risk calculation.  Patient has never been on statins before and has been mostly managed her cholesterol through healthy diet and lifestyle changes.  She is taking an omega-3 fatty acid supplement.  -Labs per orders

## 2025-03-27 NOTE — PROGRESS NOTES
Subjective:     CC:  Diagnoses of Subclavian artery stenosis, right (HCC), Dyslipidemia, Costochondritis, Abnormal laboratory test, and Encounter for screening were pertinent to this visit.    HISTORY OF THE PRESENT ILLNESS: Patient is a 79 y.o. female. This pleasant patient is here today to establish care and discuss healthcare maintenance, blood work, as well as reproducible chest pain related to costochondritis.     Problem   Costochondritis    Patient will sometimes have pain in the middle of her chest.  She states that the discomfort is constantly there however worsens for certain periods of time sometimes lasting weeks or months.  She is not currently feeling any sort of exacerbation.  However she has been to the ER multiple times and undergone cardiac workup which was normal and reassuring.  She states that she has large heavy breasts which she has been dealing with her whole life where she has to wear a very tight bra and she attributes this as the cause of her symptoms.    She is interested in OMT.     Strain of Tendon of Right Rotator Cuff    Is established with [facility] had shoulder surgery in 2023. Currently no complaints as surgery was successful.     Dyslipidemia    Risk-Enhancing Factor(s): None  Clinical ASCVD: None  LDL >190: None   ASCVD risk: Aged out of calculation    Current med: Taking Omega-3 fatty  Side effects: None    Lab Results   Component Value Date/Time    CHOLSTRLTOT 192 07/15/2022 1009    TRIGLYCERIDE 76 07/15/2022 1009    HDL 59 07/15/2022 1009     (H) 07/15/2022 1009        Subclavian Artery Stenosis, Right (Hcc)    In the setting of traumatic dissection in 2005 follows with vascular surgery in [facility]. Has not had issues or symptoms since her surgery.     Essential Hypertension, Benign (Resolved)   Pvd (Peripheral Vascular Disease) (Hcc) (Resolved)   Digital Mucous Cyst of Left Hand (Resolved)    Added automatically from request for surgery [id number]     Osteoarthritis  "(Resolved)       Health Maintenance: Completed    ROS:   Review of Systems   Constitutional:  Negative for weight loss.   Respiratory:  Negative for cough.    Cardiovascular:  Negative for leg swelling.   Gastrointestinal:  Negative for constipation, diarrhea and heartburn.   Genitourinary:  Negative for dysuria, frequency and urgency.   Musculoskeletal:  Negative for back pain.   Neurological:  Negative for headaches.   Psychiatric/Behavioral:  Negative for depression. The patient does not have insomnia.          Objective:     Exam: /84   Pulse 64   Temp 36.9 °C (98.5 °F) (Temporal)   Resp 14   Ht 1.727 m (5' 8\")   Wt 80.7 kg (177 lb 14.6 oz)   SpO2 97%  Body mass index is 27.05 kg/m².    Physical Exam  Constitutional:       General: She is not in acute distress.     Appearance: Normal appearance. She is not ill-appearing.   HENT:      Head: Normocephalic and atraumatic.      Right Ear: Tympanic membrane and ear canal normal. There is no impacted cerumen.      Left Ear: Tympanic membrane and ear canal normal. There is no impacted cerumen.      Nose: Nose normal.      Mouth/Throat:      Mouth: Mucous membranes are moist.      Pharynx: Oropharynx is clear. No posterior oropharyngeal erythema.   Eyes:      Extraocular Movements: Extraocular movements intact.      Conjunctiva/sclera: Conjunctivae normal.      Pupils: Pupils are equal, round, and reactive to light.   Cardiovascular:      Rate and Rhythm: Normal rate and regular rhythm.      Heart sounds: No murmur heard.  Pulmonary:      Effort: Pulmonary effort is normal. No respiratory distress.      Breath sounds: Normal breath sounds. No wheezing.   Abdominal:      General: Abdomen is flat. There is no distension.      Palpations: Abdomen is soft.      Tenderness: There is no abdominal tenderness.   Musculoskeletal:         General: Normal range of motion.      Cervical back: Normal range of motion.      Right lower leg: No edema.      Left lower leg: " No edema.   Lymphadenopathy:      Cervical: No cervical adenopathy.   Skin:     General: Skin is warm and dry.      Capillary Refill: Capillary refill takes less than 2 seconds.   Neurological:      General: No focal deficit present.      Mental Status: She is alert and oriented to person, place, and time.      Deep Tendon Reflexes: Reflexes normal.   Psychiatric:         Mood and Affect: Mood normal.             Assessment & Plan:   79 y.o. female with the following -    Assessment & Plan  Costochondritis  -Chronic, not in acute exacerbation however will enter periodic periods that last weeks or months of exacerbated pain in her chest and rib area.  -Negative cardiac workup multiple times.  -Is interested in OMT, we will schedule this for future appointment.       Dyslipidemia  -Chronic, stable.  Patient's age is outside of ASCVD risk calculation.  Patient has never been on statins before and has been mostly managed her cholesterol through healthy diet and lifestyle changes.  She is taking an omega-3 fatty acid supplement.  -Labs per orders       Subclavian artery stenosis, right (HCC)  -Chronic, stable after surgical intervention in 2005.  Asymptomatic.       Abnormal laboratory test  -Labs per orders  Orders:    CBC WITH DIFFERENTIAL; Future    Comp Metabolic Panel; Future    VITAMIN D,25 HYDROXY (DEFICIENCY); Future    TSH WITH REFLEX TO FT4; Future    Encounter for screening  -Healthcare maintenance screenings discussed with the patient  -Labs per orders  Orders:    HEP C VIRUS ANTIBODY; Future      Return in about 1 month (around 4/27/2025) for f/u [facility], ASAP OMT Sternal chest pain.    Please note that this dictation was created using voice recognition software. I have made every reasonable attempt to correct obvious errors, but I expect that there are errors of grammar and possibly content that I did not discover before finalizing the note.        Hodlen Hebert D.O.

## 2025-03-27 NOTE — ASSESSMENT & PLAN NOTE
-Chronic, not in acute exacerbation however will enter periodic periods that last weeks or months of exacerbated pain in her chest and rib area.  -Negative cardiac workup multiple times.  -Is interested in OMT, we will schedule this for future appointment.

## 2025-04-16 ENCOUNTER — OFFICE VISIT (OUTPATIENT)
Dept: MEDICAL GROUP | Facility: PHYSICIAN GROUP | Age: 80
End: 2025-04-16
Payer: MEDICARE

## 2025-04-16 VITALS
BODY MASS INDEX: 26.66 KG/M2 | WEIGHT: 175.93 LBS | HEIGHT: 68 IN | DIASTOLIC BLOOD PRESSURE: 84 MMHG | OXYGEN SATURATION: 96 % | RESPIRATION RATE: 18 BRPM | TEMPERATURE: 98.7 F | HEART RATE: 75 BPM | SYSTOLIC BLOOD PRESSURE: 126 MMHG

## 2025-04-16 DIAGNOSIS — R05.1 ACUTE COUGH: ICD-10-CM

## 2025-04-16 DIAGNOSIS — J20.8 ACUTE BRONCHITIS DUE TO OTHER SPECIFIED ORGANISMS: ICD-10-CM

## 2025-04-16 PROCEDURE — 3074F SYST BP LT 130 MM HG: CPT

## 2025-04-16 PROCEDURE — 3079F DIAST BP 80-89 MM HG: CPT

## 2025-04-16 PROCEDURE — 99213 OFFICE O/P EST LOW 20 MIN: CPT

## 2025-04-16 RX ORDER — AZITHROMYCIN 500 MG/1
500 TABLET, FILM COATED ORAL DAILY
Qty: 5 TABLET | Refills: 0 | Status: SHIPPED | OUTPATIENT
Start: 2025-04-16 | End: 2025-04-21

## 2025-04-16 RX ORDER — METHYLPREDNISOLONE 4 MG/1
TABLET ORAL
Qty: 21 TABLET | Refills: 0 | Status: SHIPPED | OUTPATIENT
Start: 2025-04-16

## 2025-04-16 ASSESSMENT — FIBROSIS 4 INDEX: FIB4 SCORE: 1.91

## 2025-04-16 NOTE — PATIENT INSTRUCTIONS
Acute Bronchitis, Adult    Acute bronchitis is when air tubes in the lungs (bronchi) suddenly get swollen. The condition can make it hard for you to breathe. In adults, acute bronchitis usually goes away within 2 weeks. A cough caused by bronchitis may last up to 3 weeks. Smoking, allergies, and asthma can make the condition worse.  What are the causes?  Germs that cause cold and flu (viruses). The most common cause of this condition is the virus that causes the common cold.  Bacteria.  Substances that bother (irritate) the lungs, including:  Smoke from cigarettes and other types of tobacco.  Dust and pollen.  Fumes from chemicals, gases, or burned fuel.  Indoor or outdoor air pollution.  What increases the risk?  A weak body's defense system. This is also called the immune system.  Any condition that affects your lungs and breathing, such as asthma.  What are the signs or symptoms?  A cough.  Coughing up clear, yellow, or green mucus.  Making high-pitched whistling sounds when you breathe, most often when you breathe out (wheezing).  Runny or stuffy nose.  Having too much mucus in your lungs (chest congestion).  Shortness of breath.  Body aches.  A sore throat.  How is this treated?  Acute bronchitis may go away over time without treatment. Your doctor may tell you to:  Drink more fluids. This will help thin your mucus so it is easier to cough up.  Use a device that gets medicine into your lungs (inhaler).  Use a vaporizer or a humidifier. These are machines that add water to the air. This helps with coughing and poor breathing.  Take a medicine that thins mucus and helps clear it from your lungs.  Take a medicine that prevents or stops coughing.  It is not common to take an antibiotic medicine for this condition.  Follow these instructions at home:    Take over-the-counter and prescription medicines only as told by your doctor.  Use an inhaler, vaporizer, or humidifier as told by your doctor.  Take two teaspoons  (10 mL) of honey at bedtime. This helps lessen your coughing at night.  Drink enough fluid to keep your pee (urine) pale yellow.  Do not smoke or use any products that contain nicotine or tobacco. If you need help quitting, ask your doctor.  Get a lot of rest.  Return to your normal activities when your doctor says that it is safe.  Keep all follow-up visits.  How is this prevented?    Wash your hands often with soap and water for at least 20 seconds. If you cannot use soap and water, use hand .  Avoid contact with people who have cold symptoms.  Try not to touch your mouth, nose, or eyes with your hands.  Avoid breathing in smoke or chemical fumes.  Make sure to get the flu shot every year.  Contact a doctor if:  Your symptoms do not get better in 2 weeks.  You have trouble coughing up the mucus.  Your cough keeps you awake at night.  You have a fever.  Get help right away if:  You cough up blood.  You have chest pain.  You have very bad shortness of breath.  You faint or keep feeling like you are going to faint.  You have a very bad headache.  Your fever or chills get worse.  These symptoms may be an emergency. Get help right away. Call your local emergency services (911 in the [place]  Do not wait to see if the symptoms will go away.  Do not drive yourself to the hospital.  Summary  Acute bronchitis is when air tubes in the lungs (bronchi) suddenly get swollen. In adults, acute bronchitis usually goes away within 2 weeks.  Drink more fluids. This will help thin your mucus so it is easier to cough up.  Take over-the-counter and prescription medicines only as told by your doctor.  Contact a doctor if your symptoms do not improve after 2 weeks of treatment.  This information is not intended to replace advice given to you by your health care provider. Make sure you discuss any questions you have with your health care provider.  Document Revised: 04/20/2022 Document Reviewed: 04/20/2022  Ruth Patient  Education © 2023 [employer]

## 2025-04-16 NOTE — PROGRESS NOTES
Chief Complaint   Patient presents with    Cough     X 3 days, exposure to pneumonia         HPI: Patient is a 79 y.o. female complaining of 5 days of illness including: nocturnal, paroxysmal, and productive cough, shortness of breath, nasal congestion, rhinorrhea, wheezing.   Mucus is: thick.  Similarly ill exposures: yes, her daughter who she frequently interacts with spent 3 to 4 days in the hospital recently for bacterial pneumonia.  Treatments tried: Tea honey and ibuprofen as well as vitamin C and turmeric  She  reports that she quit smoking about 24 years ago. Her smoking use included cigarettes. She has never used smokeless tobacco..     ROS:  No fever, nausea, changes in bowel movements or skin rash.      I reviewed the patient's medications, allergies and medical history:  Current Outpatient Medications   Medication Sig Dispense Refill    methylPREDNISolone (MEDROL DOSEPAK) 4 MG Tablet Therapy Pack Per  directions on package 21 Tablet 0    azithromycin (ZITHROMAX) 500 MG tablet Take 1 Tablet by mouth every day for 5 days. 5 Tablet 0    estradiol (ESTRACE VAGINAL) 0.1 MG/GM vaginal cream Apply 1g cream inside vagina using applicator nightly for 2 weeks, then twice per week thereafter 42.5 g 3    Omega-3 Fatty Acids (FISH OIL PO) Take 1 Capsule by mouth every day.      TURMERIC PO Take 1 Tablet by mouth every day.      GLUCOSAMINE-CHONDROITIN PO Take 1 Tablet by mouth every day.      therapeutic multivitamin-minerals (THERAGRAN-M) Tab Take 1 Tablet by mouth every day.       No current facility-administered medications for this visit.     Patient has no known allergies.  Past Medical History:   Diagnosis Date    Arthritis     pt denies    Bronchitis     remote hx    Cataract     Dental disorder     4 implants upper right    Digital mucous cyst of left hand 08/04/2021    Added automatically from request for surgery [id number]      Dyslipidemia 07/29/2022    Essential hypertension, benign 07/29/2022     "Essential hypertension, benign 07/29/2022    H/O total hysterectomy 02/11/2025    Osteoarthritis 08/05/2009    Pain 05/2023    R shoulder pain    PVD (peripheral vascular disease) ([facility])     PVD (peripheral vascular disease) ([facility]) 07/29/2022    Subclavian artery stenosis, right ([facility])     In the setting of traumatic dissection in 2005 follows with     Urinary incontinence     controlled with exercises        EXAM:  /84   Pulse 75   Temp 37.1 °C (98.7 °F) (Temporal)   Resp 18   Ht 1.727 m (5' 8\")   Wt 79.8 kg (175 lb 14.8 oz)   SpO2 96%   General: Alert, no conversational dyspnea or audible wheeze, non-toxic appearance.  Eyes: PERRL, conjunctiva slightly injected, no eye discharge.  Ears: Normal pinnae,TM's erythematous bilaterally.  Nares: Patent with thick mucus.  Sinuses: non tender over maxillary / frontal sinuses.  Throat: Erythematous injection without exudate.   Neck: Supple, with no adenopathy, normal cervical nodes.  Lungs: Good air movement bilaterally, mild wheezes and crackles, however no rhonchi.   Heart: Regular rate without murmur.  Skin: Warm and dry without rash.     ASSESSMENT:   1. Acute bronchitis due to other specified organisms    2. Acute cough          PLAN:  1. As symptoms have been worsening over the last week and daughter was recently hospitalized with bacterial pneumonia, will treat with antibiotics as well as steroids.  2. Twice daily use of nasal saline rinse or Neti-Pot.  3. OTC anti-pyretics and decongestants as needed.  4. Follow-up in office or urgent care for worsening symptoms, difficulty breathing, lack of expected recovery, or should new symptoms or problems arise.    "

## 2025-04-30 ENCOUNTER — GYNECOLOGY VISIT (OUTPATIENT)
Dept: GYNECOLOGY | Facility: CLINIC | Age: 80
End: 2025-04-30
Payer: MEDICARE

## 2025-04-30 VITALS
BODY MASS INDEX: 26.83 KG/M2 | WEIGHT: 177 LBS | HEIGHT: 68 IN | SYSTOLIC BLOOD PRESSURE: 145 MMHG | DIASTOLIC BLOOD PRESSURE: 69 MMHG

## 2025-04-30 DIAGNOSIS — N81.6 RECTOCELE: ICD-10-CM

## 2025-04-30 DIAGNOSIS — L90.0 LICHEN SCLEROSUS: ICD-10-CM

## 2025-04-30 DIAGNOSIS — N95.8 GENITOURINARY SYNDROME OF MENOPAUSE: ICD-10-CM

## 2025-04-30 DIAGNOSIS — R15.9 INCONTINENCE OF FECES, UNSPECIFIED FECAL INCONTINENCE TYPE: ICD-10-CM

## 2025-04-30 DIAGNOSIS — N39.41 URGENCY INCONTINENCE: ICD-10-CM

## 2025-04-30 RX ORDER — CLOBETASOL PROPIONATE 0.5 MG/G
OINTMENT TOPICAL
Qty: 1 EACH | Refills: 3 | Status: SHIPPED | OUTPATIENT
Start: 2025-04-30

## 2025-04-30 ASSESSMENT — FIBROSIS 4 INDEX: FIB4 SCORE: 1.91

## 2025-04-30 NOTE — Clinical Note
Cal Talley,  Thank you  for referring Mrs. Sam for urinary incontinence and fecal incontinence. Attached is my note for your review. Let me know if you have questions or concerns.  Take care,  Hoa Mclean MD Female Pelvic Medicine and Reconstructive Surgery Department of Obstetrics and Gynecology [facility]

## 2025-04-30 NOTE — PROGRESS NOTES
PT here today for consult   UTI Sx: none  Ref for mixed stress and urge incontinence, incontinence feces  Hysterectomy: yes  Good #: [phone]   PVR :  0 mL   Pharmacy Verified

## 2025-04-30 NOTE — PROGRESS NOTES
Urogynecology & Reconstructive Pelvic Surgery - Consultation Visit    Tracy Sam MRN:[id number] :1945    Referred by: SOLANGE Henson    Reason for Visit:   Chief Complaint   Patient presents with    New Patient     Ref for mixed stress and urge incontinence         Subjective     History of Presenting Illness:  Tracy Sam is a 79 y.o. P2 HTN, PVD who presents for the evaluation and management of urinary and fecal incontinence.    First develop leaking of urine in . Did PFPT which helped symptoms but over the last year has worsening symptoms. She has the urge and then is already leaking before she can get to the bathroom and underwear is wet. Unsure if she is leaking with activities because she is always active but has not noticed this. She has started PFMEs again since seeing Gerri and has already noticed some improvement.     She has itching on perineum that she thought was due to not being clean enough. But she is diligent with her hygiene but itching persists.     Also has stool leakage up to 3x/week. No prior. Typically softer-liquid stool.   Eats a lot of fiber in her diet. Takes lactobacillus probiotics to avoid constipation. This started last year.     Moderate nerve impingement of L5-S1.    She was referred to PFPT and restarted on vaginal estrogen by Gerri Peterson. Has started this but has some difficulty remembering twice a week.     Prior Pelvic surgery:    RADHA - fibroids, endometriosis    BSO - ovarian cyst, endometriosis   Appy   Csx2 - accreta with first delivery.      Prior treatment:   Vaginal estrogen  PFPT ([facility])  Heather     Fluid intake:   Water: 40-48oz, bubbles   Coffee: 8-16oz  Tea: 8-16oz  Soda: -  Juice: 8oz    Pelvic floor symptom review:     Bladder:   Voids per day: 4-5 Voids per night: 1-2      Urinary incontinence episodes per day: 1-2    Urge leakage:  On Movement to Bathroom   Stress leakage: None   Continuous / insensible urine loss:  No    Nocturnal enuresis: No    Leakage volume: Drops   Number of pads/day: uses tissue paper and changes 1-2x    Bladder emptying: Incomplete   Voiding symptoms: Double or Triple Voiding   UTI in last 12 months: 1   Other urologic history: none      Prolapse:     Prolapse symptoms: None   Degree of prolapse: n/a   Exacerbating factors:n/a   Relieving factors: n/a   Duration of prolapse symptoms: n/a      Bowel:    Constipation: No    Bowel movements per day: 2-3 , stool quality: formed pieces    Straining to empty bowels: No    Splinting to evacuate: No    Painful evacuation: No    Difficulty emptying rectum: No    Incontinence to stool: Yes 3x/week, softer-liquid stool, no prior urge   Blood in stool: No    Hemorrhoids: Yes   Bowel conditions: Diverticulosis   Most recent colonoscopy: 2017      Sexual function:    Sexually active: prefers not to answer    Gender of partners: male    Pain with intercourse: No   History of abuse: No        Pelvic Pain: None      Past medical and surgical history    Past obstetric history   Number of vaginal deliveries: 0   Number of  deliveries: 2   History of vacuum/forceps: No    History of obstetric anal sphincter injury: No     Past gynecological history:    Last menstrual period/Menopause: No LMP recorded. Patient has had a hysterectomy.   History of abnormal uterine bleeding: No    History of fibroids: Yes   History of endometrial polyps:  No    History of endometriosis: Yes   History of cervical dysplasia: No    Last pap: s/p hyst   Current contraception: s/p hyst       Past medical history:  Past Medical History:   Diagnosis Date    H/O total hysterectomy 2025    Pain 2023    R shoulder pain    Essential hypertension, benign 2022    Dyslipidemia 2022    Essential hypertension, benign 2022    PVD (peripheral vascular disease) ([facility]) 2022    Digital mucous cyst of left hand 2021    Added automatically from request for surgery  "[id number]      Osteoarthritis 08/05/2009    Arthritis     pt denies    Bronchitis     remote hx    Cataract     Dental disorder     4 implants upper right    PVD (peripheral vascular disease) ([facility])     Subclavian artery stenosis, right ([facility])     In the setting of traumatic dissection in 2005 follows with     Urinary incontinence     controlled with exercises     Past surgical history:  Past Surgical History:   Procedure Laterality Date    PB SHLDR ARTHROSCOP,SURG,W/ROTAT CUFF REPB Right 6/14/2023    Procedure: Shoulder arthroscopy with subacromial decompression, extensive debridement, bicep tenodesis, balloon placement,  rotator cuff repair;  Surgeon: Pepito Johnson M.D.;  Location: SURGERY [facility];  Service: Orthopedics    HYSTERECTOMY, TOTAL ABDOMINAL  1990    fibroids    APPENDECTOMY      CATARACT EXTRACTION WITH IOL Bilateral     COLONOSCOPY      COLONOSCOPY  2098    neg    PRIMARY C SECTION      x 2    TONSILLECTOMY       Medications:has a current medication list which includes the following prescription(s): methylprednisolone, estradiol, omega-3 fatty acids, turmeric, glucosamine-chondroitin, and therapeutic multivitamin-minerals.  Allergies:Patient has no known allergies.  Family history:  Family History   Problem Relation Age of Onset    Heart Disease Father     Breast Cancer Sister 59    Diabetes Neg Hx     Stroke Neg Hx      Social history: reports that she quit smoking about 24 years ago. Her smoking use included cigarettes. She has never used smokeless tobacco. She reports current alcohol use of about 0.6 oz of alcohol per week. She reports that she does not use drugs.    Review of systems: A full review of systems was performed, and negative with the exception of want is noted above in the HPI.        Objective        BP (!) 145/69 (BP Location: Left arm, Patient Position: Sitting, BP Cuff Size: Adult)   Ht 5' 8\"   Wt 177 lb   BMI 26.91 kg/m²     Physical Exam  Constitutional:       Appearance: " Normal appearance. She is normal weight.   HENT:      Head: Normocephalic.      Nose: Nose normal.   Eyes:      Pupils: Pupils are equal, round, and reactive to light.   Pulmonary:      Effort: Pulmonary effort is normal.   Abdominal:      Palpations: Abdomen is soft.   Musculoskeletal:         General: Normal range of motion.      Cervical back: Normal range of motion.   Skin:     General: Skin is warm.   Neurological:      General: No focal deficit present.      Mental Status: She is alert.   Psychiatric:         Mood and Affect: Mood normal.          Genitourinary:    Vulva: lichen sclerosus at posterior fourchette and perineum (hypopigmentation of tissue)   Bulbocavernosus reflex: Intact   Anal wink reflex: Intact   Perineal sensation: WNL   Urethra: WNL   Vagina: Atrophic   Atrophy: Mild   Cough stress test: Negative    Chaperone was present throughout the physical exam.     Pelvic floor:    POP-Q:   Aa -3 Ba -3 C -7.5   GH 4.5 PB 3 TVL7.5   Ap 0 Bp 0 D x      Prolapse stage: 2   Cervical elongation: No    Urethral tenderness: No    Bladder/ suprapubic tenderness: No    Levator tenderness: None   Levator muscle tone: WNL   Pelvic floor contraction strength (modified Oxford scale): 2=Weak   Pelvic floor contraction duration: Normal   Bimanual exam: surgically absent uterus and cervix   Anal resting tone: WNL   Anal squeeze tone: WNL   Palpable anal sphincter defect: No    Granulation tissue: No    Epithelial erosions: No    Epithelial ulcerations: No    Fistula: None  Vaginal band/stricture: No     Procedure Performed: No    Diagnostic test and records review:    Urine dipstick: void prior to appt     Post-void residual: 0 mL, performed by Bladder Scanner    Labs:     Lab Results   Component Value Date/Time    SODIUM 138 04/09/2024 0907    POTASSIUM 4.2 04/09/2024 0907    CHLORIDE 100 04/09/2024 0907    CO2 26 04/09/2024 0907    GLUCOSE 95 04/09/2024 0907    BUN 12 04/09/2024 0907    CREATININE 0.64 04/09/2024  0907    CALCIUM 9.5 04/09/2024 0907    ANION 12.0 04/09/2024 0907       Lab Results   Component Value Date/Time    WBC 4.9 04/09/2024 09:07 AM    RBC 4.46 04/09/2024 09:07 AM    HEMOGLOBIN 14.6 04/09/2024 09:07 AM    MCV 98.4 (H) 04/09/2024 09:07 AM    MCH 32.7 04/09/2024 09:07 AM    MCHC 33.3 04/09/2024 09:07 AM    RDW 44.3 04/09/2024 09:07 AM    MPV 8.9 (L) 04/09/2024 09:07 AM         Radiology: None    Procedural: None    Documentation reviewed: Prior EMR Records    Outside records reviewed: 0 pages      Assessment & Plan     Tracy Sam is a 79 y.o. P2 with urge urinary incontinence, fecal incontinence, Stage 2 rectocele, lichen sclerosus and GSM. We discussed my recommendations for further diagnosis and treatment at length today.     Assessment & Plan  Urgency incontinence  This patient has overactive bladder; She was educated on the pathophysiology of bladder urgency, and that her symptoms are likely due to overactivity of the bladder muscle and nerves. Conservative/behavioral management strategies were reviewed, including fluid management, avoidance of bladder irritants, urge strategies and Kegel's exercises. Moderate weight loss in obese patients (5-8%) can lead to significant urinary symptom improvement. Overview of pelvic floor physical therapy, biofeedback, and second-line oral medical therapy (anticholinergics, beta-3 agonists) and third-line therapies (intravesical botox injection, PTNS, sacral neuromodulation) discussed.   - She will scheduled with PFPT.   - She is also interested in PTNS. Discussed percutaneous tibial nerve stimulation for urge urinary incontinence. This therapy involves the placement of a small needle in to the skin behind the ankle.  Electrical stimulation is then given to the needle and carried by the tibial nerve to the nerves of the pelvis that control the bladder reflexes. For optimal response, 12 treatments (30 minutes each) are recommended, with maintenance therapy  afterwards as needed. Approximately 50-80% of patients have improvement of urinary symptoms with PTNS.  Efficacy may sometimes be improved with addition of pelvic floor physical therapy or an oral medication. Discussed technical details, including risks, benefits, and alternatives to PTNS.  Patient had the opportunity to ask questions which were answered by me today.  She agrees to plan and will follow up with therapy.   She would like to schedule this after middle of June due to her schedule.   Orders:    PROCEDURE PERCUTANEOUS TIBIAL NERVE STIMULATION; Future    Incontinence of feces, unspecified fecal incontinence type  The patient suffers from fecal incontinence, predominantly passive. I discussed with the patient possible etiologies and treatments related to her diagnosis, including:   Diet: Instructions were given to experiment with her diet to see if certain foods worsen the situation. In particular, an excessive high fiber diet (too much bran, cereal, fruit, etc.), too much caffeine or alcohol and a lot of artificial sweeteners can worsen fecal incontinence. I advised the patient to avoid foods and supplements that have laxative properties which may exacerbate fecal incontinence symptoms.   Behavioral: Management of fecal incontinence can be helped by ensuring bowel movements at regular time(s) during the day. Correct positioning when emptying bowels and how to empty the bowels without straining was reviewed. Kegel exercises were also reviewed.   Medications for stool bulking: The patient was also instructed on the use of Loperamide OTC, starting at a low dose (half tablet) and titrating up, holding for constipation.  Physical therapy: I offered patient pelvic floor physical therapy with or without biofeedback which can improve pelvic floor function and possibly improve incontinence symptoms.   Surgical: We discussed the full range of possible surgical interventions including sacral neuromodulation and  revision of the anal sphincter.   Plan: Suspect with her softer/liquid stools, it may be trapped in her rectocele then as she stands leaks out later. On exam she has good resting and squeeze anal tone. PFPT will help with this but also recommend bulking up her stool to avoid liquid stools so that she can have a more complete BM.        Lichen sclerosus  Patient has evidence of lichen sclerosus on exam today consistent with her perineal pruritus symptoms. Discussed this is typically a benign, chronic skin condition with inflammation, skin thinning, hypopigmentation skin changes associated with pain and pruritus. Typically this occurs in the anogenital region and associated with low estrogen states (prepuberty, postmenopause). Recommend treatment with topical high dose steroid, clobetasol ointment. She was counseled she need to apply this consistently for 12 months. If no improvements seen then a biopsy of the site is recommend to confirm the diagnosis.   - FU in 1 month  Orders:    clobetasol (TEMOVATE) 0.05 % Ointment; Place small amount to external vulva twice per day for 1 month, once per day for one month, then twice per week.    Genitourinary syndrome of menopause  She has vaginal atrophy on examination. Reviewed risks, benefits, and indications for vaginal estrogen therapy.  Continue with vaginal estrogen therapy twice weekly.         Rectocele  She has symptomatic pelvic organ prolapse, posterior predominant.  In most cases, this is not a dangerous condition that necessitates treatment in all patients, but treatment is offered when it impacts quality of life, bladder function, or bowel function.  I reviewed the clinical findings and discussed the pathogenesis extensively, including genetic tendency, aging, menopause and childbirth injuries. We discussed options for management, including both nonsurgical and surgical options.   Nonsurgical options include both expectant management, pelvic floor physical therapy  to prevent progression, and pessary use. I discussed different types of pessary as well as pessary care.   We reviewed all surgical options including both vaginal and laparoscopic reconstructive approaches, and those using native tissue (non-mesh) and mesh augmented approaches.  We discussed recurrent/retreatment risk for all surgical approaches.  Native tissue (nonmesh) approaches have a retreatment rate in excess of 20% long-term, and this is reduced with mesh augmentation (approximately 5-8%).  Current forms of surgical mesh have been extensively evaluated for their safety, but there is a 1 to 5% risk long-term of mesh complications, the majority of which include mesh exposure, which was discussed with her.  She is asymptomatic from this other than it may be contributing to her fecal incontinence.   She would like to proceed with PFPT. If not improvement in bowel symptoms, will consider a pessary.          Medical decision making (MDM)  50 minutes total time spent on the date of the encounter:    5 min reviewing records  20 min with the history and physical exam   15 min  spent in direct patient education and counseling   5 min spent in the coordination of care  5 min spent in electronic medical record documentation in the patient's chart.    Hoa Mclean MD  Urogynecology and Reconstructive Pelvic Surgery  Department of Obstetrics and Gynecology  [facility]

## 2025-05-01 NOTE — Clinical Note
REFERRAL APPROVAL NOTICE         Sent on May 1, 2025                   Tracy Sam  [address] [place] [address]                   Dear Ms. Sam,    After a careful review of the medical information and benefit coverage, [facility] has processed your referral. See below for additional details.    If applicable, you must be actively enrolled with your insurance for coverage of the authorized service. If you have any questions regarding your coverage, please contact your insurance directly.    REFERRAL INFORMATION   Referral #:  [id number]  Referred-To Department    Referred-By Provider:  Gynecology    Hoa Mclean M.D.   [facility]      [address]  [address]  [place] [id number] [phone] [address]  [place] [id number] [phone]    Referral Start Date:  04/30/2025  Referral End Date:   04/30/2026             SCHEDULING  If you do not already have an appointment, please call [phone] to make an appointment.     MORE INFORMATION  If you do not already have a [employer] account, sign up at: [email or web address]  You can access your medical information, make appointments, see lab results, billing information, and more.  If you have questions regarding this referral, please contact  the [facility] Referrals department at:             [phone]. Monday - Friday 8:00AM - 5:00PM.     Sincerely,    [facility]

## 2025-05-05 ENCOUNTER — TELEPHONE (OUTPATIENT)
Dept: GYNECOLOGY | Facility: CLINIC | Age: 80
End: 2025-05-05
Payer: MEDICARE

## 2025-05-08 ENCOUNTER — OFFICE VISIT (OUTPATIENT)
Dept: MEDICAL GROUP | Facility: PHYSICIAN GROUP | Age: 80
End: 2025-05-08
Payer: MEDICARE

## 2025-05-08 VITALS
HEIGHT: 68 IN | SYSTOLIC BLOOD PRESSURE: 138 MMHG | BODY MASS INDEX: 26.8 KG/M2 | TEMPERATURE: 97.8 F | DIASTOLIC BLOOD PRESSURE: 70 MMHG | HEART RATE: 74 BPM | WEIGHT: 176.8 LBS | RESPIRATION RATE: 14 BRPM | OXYGEN SATURATION: 96 %

## 2025-05-08 DIAGNOSIS — M99.05 SOMATIC DYSFUNCTION OF PELVIC REGION: ICD-10-CM

## 2025-05-08 DIAGNOSIS — M99.03 SOMATIC DYSFUNCTION OF SPINE, LUMBAR: ICD-10-CM

## 2025-05-08 PROCEDURE — 3078F DIAST BP <80 MM HG: CPT

## 2025-05-08 PROCEDURE — 99213 OFFICE O/P EST LOW 20 MIN: CPT | Mod: 25

## 2025-05-08 PROCEDURE — 3075F SYST BP GE 130 - 139MM HG: CPT

## 2025-05-08 PROCEDURE — 98925 OSTEOPATH MANJ 1-2 REGIONS: CPT

## 2025-05-08 ASSESSMENT — FIBROSIS 4 INDEX: FIB4 SCORE: 1.91

## 2025-05-08 NOTE — PROCEDURES
OMT Procedure Note   Provider: Holden Hebert DO    Indication for Procedure: left-sided pelvic somatic dysfunction identified on physical examination, contributing to localized pain and restricted motion.      Procedure Performed: Osteopathic Manipulative Treatment (OMT)    Techniques Utilized: Muscle Energy, Articulatory, Facilitated Positional Release      Description of Procedure:   The patient was evaluated for pelvic somatic dysfunction prior to intervention. Findings included asymmetry of the R/L ilium with anterior rotation and restricted motion on the R/L sacroiliac joint, consistent with a Left-sided pelvic somatic dysfunction. OMT was performed as follows in a bilateral fashion with a focus on the side of dysfunction:    1. Muscle Energy Technique (MET):     - The patient was positioned supine with legs extended.       - The ilium was assessed for anterior rotation. The patient’s hip was flexed to engage the restrictive barrier, approximately 90 degrees, while stabilizing the pelvis with counterpressure at the oposite ASIS.       - The patient was instructed to gently push their treatment leg against my resistance (isometric contraction of the hip extensors) for 3-5 seconds, followed by relaxation. This was repeated for 3 cycles.       - Post-treatment reassessment showed improved iliac symmetry and increased range of motion at the hip.    2. Articulatory Technique:      - The patient remained supine.       - The sacroiliac joint was addressed by applying gentle, rhythmic springing pressure to the ilium while stabilizing the sacrum. The motion was directed to mobilize the joint through its restrictive barrier, using a low-velocity, moderate-amplitude approach.       - Treatment continued until a palpable increase in joint play and ease of motion were noted, approximately 1-2 minutes.       - Reassessment revealed decreased tenderness and improved sacroiliac joint mobility.    3. Facilitated Positional  Release (FPR):      - The patient was positioned again in the supine position.       - The treatment pelvis was placed into a position of ease by flexing of the hip to 90 degrees, alternating between adducting and abducting, in addition to some slight external rotation of the the hip, while applying a gentle compressive force through the ilium toward the sacrum.       - This position was held for approximately 5-10 seconds until a release was palpated, indicated by tissue relaxation and softening under the treating hand. The hip was then mobilized in a superior lateral sweeping motion until hip and knee were at complete extension.  This was repeated 3 times     - Post-treatment evaluation demonstrated reduced tension in the surrounding musculature and improved pelvic alignment.    Findings Post-Treatment:    Following the application of OMT, the Left-sided pelvic somatic dysfunction showed significant improvement. Pelvic landmarks (ASIS and PSIS) were more symmetric bilaterally, sacroiliac joint motion was less restricted, and the patient reported decreased discomfort with movement. No adverse effects were observed.    Patient Response:    The patient tolerated the procedure well with no complications. They reported immediate relief of right-sided pelvic discomfort and improved ease of motion.    Plan:    - Follow-up as needed for reassessment of pelvic mechanics and symptom recurrence.    - Patient instructed to return if symptoms persist or worsen.

## 2025-05-08 NOTE — PROGRESS NOTES
"Verbal consent was acquired by the patient to use [employer] ambient listening note generation during this visit    Subjective:     HPI:   History of Present Illness  The patient presents for evaluation of back pain.    Back Pain  She reports a significant back issue, specifically an impingement at the L5-S1 level, which was diagnosed as moderate 2 years ago. This condition has been causing difficulty in maintaining an upright posture during ambulation. She does not experience any associated hip or leg pain. She is able to lie supine without discomfort. She has previously sought treatment from a physical therapist.  - Onset: Diagnosed 2 years ago.  - Location: L5-S1 level.  - Character: Impingement causing difficulty in maintaining an upright posture during ambulation.  - Alleviating Factors: Able to lie supine without discomfort.  - Severity: Moderate.    History of Falls and Shoulder Complications  She mentions a history of falls, which have resulted in shoulder complications.    Costochondritis and Cough  She was prescribed Medrol for costochondritis due to anticipated frequent coughing. She experienced a cough for 2 weeks, followed by her regular cough, and did not develop costochondritis. She does not have chest pain.  - Onset: Cough for 2 weeks followed by regular cough.  - Duration: 2 weeks.  - Character: Cough without development of costochondritis.    Additional Information  Additionally, she notes that her big toe occasionally turns blue.    Objective:     Exam:  /70   Pulse 74   Temp 36.6 °C (97.8 °F) (Temporal)   Resp 14   Ht 1.727 m (5' 8\")   Wt 80.2 kg (176 lb 12.8 oz)   SpO2 96%   BMI 26.88 kg/m²  Body mass index is 26.88 kg/m².    Physical Exam  Constitutional:       General: She is not in acute distress.     Appearance: Normal appearance. She is not ill-appearing.   Eyes:      Conjunctiva/sclera: Conjunctivae normal.   Cardiovascular:      Rate and Rhythm: Normal rate and regular " rhythm.      Heart sounds: No murmur heard.  Pulmonary:      Effort: Pulmonary effort is normal. No respiratory distress.      Breath sounds: Normal breath sounds. No wheezing or rhonchi.   Skin:     General: Skin is warm and dry.      Capillary Refill: Capillary refill takes less than 2 seconds.   Neurological:      General: No focal deficit present.      Mental Status: She is alert and oriented to person, place, and time.   Psychiatric:         Mood and Affect: Mood normal.       Osteopathic Assessment:     - Lumbar Spine: Somatic dysfunction at L4-L5, diagnosed as L4 flexed, rotated Left, sidebent Right. Tissue texture changes (boggy, restricted) noted at L4-L5 on the left.      - Pelvis: Left innominate anterior rotation dysfunction with restricted posterior glide.     - Hip: right iliopsoas and piriformis tenderness with mild restriction in passive motion testing. Positive Elier test on the right suggesting iliopsoas tightness.    - Special Tests:       - Negative straight leg raise bilaterally (no radicular symptoms).       - Positive Yeoman’s test on the right (SI joint provocation).      - Trendelenburg sign negative bilaterally (gluteus medius strength intact).           Results      Assessment & Plan:     1. Somatic dysfunction of pelvic region        2. Somatic dysfunction of spine, lumbar            Assessment & Plan  1. Back pain: Chronic. Somatic dysfunction lumbar spine and pelvis  - Likely due to a pinched nerve at the L5-S1 level, causing difficulty in walking upright. No associated hip or leg pain. The left side appears more restricted, suggesting tighter muscles on the right side compensating for an issue on the left.  - OMT preformed in clinic, see procedure note for details  - Perform a series of exercises targeting the hips with instructions provided for home practice.  - Remain as relaxed as possible during exercises and engage muscles as instructed.    Follow-up  - Appointment scheduled for  next week to specifically address the lower back pain.      No follow-ups on file.    Please note that this dictation was created using voice recognition software. I have made every reasonable attempt to correct obvious errors, but I expect that there are errors of grammar and possibly content that I did not discover before finalizing the note.

## 2025-05-15 ENCOUNTER — OFFICE VISIT (OUTPATIENT)
Dept: MEDICAL GROUP | Facility: PHYSICIAN GROUP | Age: 80
End: 2025-05-15
Payer: MEDICARE

## 2025-05-15 VITALS
HEART RATE: 73 BPM | TEMPERATURE: 98.4 F | HEIGHT: 68 IN | RESPIRATION RATE: 14 BRPM | WEIGHT: 176 LBS | BODY MASS INDEX: 26.67 KG/M2 | OXYGEN SATURATION: 98 % | DIASTOLIC BLOOD PRESSURE: 82 MMHG | SYSTOLIC BLOOD PRESSURE: 128 MMHG

## 2025-05-15 DIAGNOSIS — M99.03 SOMATIC DYSFUNCTION OF SPINE, LUMBAR: Primary | ICD-10-CM

## 2025-05-15 DIAGNOSIS — M99.05 SOMATIC DYSFUNCTION OF PELVIC REGION: ICD-10-CM

## 2025-05-15 PROCEDURE — 99213 OFFICE O/P EST LOW 20 MIN: CPT | Mod: 25

## 2025-05-15 PROCEDURE — 3074F SYST BP LT 130 MM HG: CPT

## 2025-05-15 PROCEDURE — 98925 OSTEOPATH MANJ 1-2 REGIONS: CPT

## 2025-05-15 PROCEDURE — 3079F DIAST BP 80-89 MM HG: CPT

## 2025-05-15 ASSESSMENT — FIBROSIS 4 INDEX: FIB4 SCORE: 1.91

## 2025-05-16 NOTE — PROGRESS NOTES
"Verbal consent was acquired by the patient to use [employer] ambient listening note generation during this visit     Subjective:     HPI:   History of Present Illness  The patient presents for evaluation of back pain.    Back Pain  She reports a significant reduction in her back pain, estimating it to be between 40 to 50 percent following the last treatment. However, she notes that the severity of the pain is contingent on her physical activity, particularly bending movements, which exacerbate the discomfort. She expresses a desire to undergo the same treatment regimen as previously administered, specifically targeting the pelvic area and extending to the back.  - Onset: Following the last treatment.  - Location: Pelvic area extending to the back.  - Character: Pain severity contingent on physical activity, particularly bending movements.  - Alleviating/Aggravating Factors: Physical activity, particularly bending movements, exacerbate the discomfort.  - Severity: Significant reduction in pain, estimated to be between 40 to 50 percent.    Blood Test Screening  Additionally, there was a discussion regarding a blood test screening for a virus, which the patient has not yet completed. She was informed that this screening is a standard procedure for all adults and is part of healthcare maintenance.    Objective:     Exam:  /82   Pulse 73   Temp 36.9 °C (98.4 °F) (Temporal)   Resp 14   Ht 1.727 m (5' 8\")   Wt 79.8 kg (176 lb)   SpO2 98%   BMI 26.76 kg/m²  Body mass index is 26.76 kg/m².    Physical Exam  Constitutional:       General: She is not in acute distress.     Appearance: Normal appearance. She is not ill-appearing.   Eyes:      Conjunctiva/sclera: Conjunctivae normal.   Cardiovascular:      Rate and Rhythm: Normal rate and regular rhythm.      Heart sounds: No murmur heard.  Pulmonary:      Effort: Pulmonary effort is normal. No respiratory distress.      Breath sounds: Normal breath sounds. No " wheezing or rhonchi.   Skin:     General: Skin is warm and dry.      Capillary Refill: Capillary refill takes less than 2 seconds.   Neurological:      General: No focal deficit present.      Mental Status: She is alert and oriented to person, place, and time.   Psychiatric:         Mood and Affect: Mood normal.       Osteopathic Assessment:     - Lumbar Spine: Somatic dysfunction at L4-L5, diagnosed as L4 flexed, rotated Left, sidebent Right. Tissue texture changes (boggy, restricted) noted at L4-L5 on the left.      - Pelvis: Left innominate anterior rotation dysfunction with restricted posterior glide.     - Hip: right iliopsoas and piriformis tenderness with mild restriction in passive motion testing. Positive Elier test on the right suggesting iliopsoas tightness.    - Special Tests:       - Negative straight leg raise bilaterally (no radicular symptoms).       - Positive Yeoman’s test on the right (SI joint provocation).      - Trendelenburg sign negative bilaterally (gluteus medius strength intact).         Results      Assessment & Plan:     No diagnosis found.    Assessment & Plan  1. Back pain: Chronic.  - Reported a 40-50% reduction in pain from the previous treatment, particularly when not bending frequently.  - Pain appears to be due to muscle imbalance, which can cause inflammation and tightness.  - Osteopathic manipulative treatment (OMT) was performed, focusing on the pelvic area and lower back.  - Additional muscle retraining for the lumbar spine was recommended.    2. Health maintenance.  - Informed that the HIV test is a standard screening for all adults and is part of routine healthcare maintenance.  - Advised to complete the HIV screening test, which has not been done yet.  - Discussed the importance of having a documented negative result in the chart for future reference.  - Explained that the screening is not due to suspicion but is a preventive measure.    Follow-up  - Advised to schedule  another session at convenience.    PROCEDURE  Procedure: Osteopathic Manipulative Treatment (OMT) focusing on the pelvic area and lower back    All questions were answered and agreement to proceed was given after the following Pre-Procedure details were reviewed:  - Risks and Benefits: Discussed potential benefits of pain relief and improved mobility; risks include temporary discomfort or soreness.  - Side effects: Temporary discomfort or soreness  - Consent: Verbal consent obtained    Intra-Procedure:  - Time-Out: Confirmed patient identity and procedure details    Post-Procedure:  - Tolerance Level: Patient tolerated the procedure well  - Home Care Instructions: Advised to monitor for any increased pain or discomfort and to follow up as needed    Return in about 1 month (around 6/15/2025) for f/u OMT.    Please note that this dictation was created using voice recognition software. I have made every reasonable attempt to correct obvious errors, but I expect that there are errors of grammar and possibly content that I did not discover before finalizing the note.

## 2025-05-16 NOTE — PROCEDURES
OMT Procedure Note   Provider: Holden Hebert DO    Indication for Procedure: left-sided pelvic somatic dysfunction identified on physical examination, contributing to localized pain and restricted motion.       Procedure Performed: Osteopathic Manipulative Treatment (OMT)    Techniques Utilized: Muscle Energy, Articulatory, Facilitated Positional Release       Description of Procedure:   The patient was evaluated for pelvic somatic dysfunction prior to intervention. Findings included asymmetry of the R/L ilium with anterior rotation and restricted motion on the R/L sacroiliac joint, consistent with a Left-sided pelvic somatic dysfunction. OMT was performed as follows in a bilateral fashion with a focus on the side of dysfunction:     1. Muscle Energy Technique (MET):     - The patient was positioned supine with legs extended.       - The ilium was assessed for anterior rotation. The patient’s hip was flexed to engage the restrictive barrier, approximately 90 degrees, while stabilizing the pelvis with counterpressure at the oposite ASIS.       - The patient was instructed to gently push their treatment leg against my resistance (isometric contraction of the hip extensors) for 3-5 seconds, followed by relaxation. This was repeated for 3 cycles.       - Post-treatment reassessment showed improved iliac symmetry and increased range of motion at the hip.     2. Articulatory Technique:      - The patient remained supine.       - The sacroiliac joint was addressed by applying gentle, rhythmic springing pressure to the ilium while stabilizing the sacrum. The motion was directed to mobilize the joint through its restrictive barrier, using a low-velocity, moderate-amplitude approach.       - Treatment continued until a palpable increase in joint play and ease of motion were noted, approximately 1-2 minutes.       - Reassessment revealed decreased tenderness and improved sacroiliac joint mobility.     3. Facilitated  Positional Release (FPR):      - The patient was positioned again in the supine position.       - The treatment pelvis was placed into a position of ease by flexing of the hip to 90 degrees, alternating between adducting and abducting, in addition to some slight external rotation of the the hip, while applying a gentle compressive force through the ilium toward the sacrum.       - This position was held for approximately 5-10 seconds until a release was palpated, indicated by tissue relaxation and softening under the treating hand. The hip was then mobilized in a superior lateral sweeping motion until hip and knee were at complete extension.  This was repeated 3 times     - Post-treatment evaluation demonstrated reduced tension in the surrounding musculature and improved pelvic alignment.     Findings Post-Treatment:    Following the application of OMT, the Left-sided pelvic somatic dysfunction showed significant improvement. Pelvic landmarks (ASIS and PSIS) were more symmetric bilaterally, sacroiliac joint motion was less restricted, and the patient reported decreased discomfort with movement. No adverse effects were observed.     Patient Response:    The patient tolerated the procedure well with no complications. They reported immediate relief of right-sided pelvic discomfort and improved ease of motion.     Plan:    - Follow-up as needed for reassessment of pelvic mechanics and symptom recurrence.    - Patient instructed to return if symptoms persist or worsen

## 2025-05-30 ENCOUNTER — TELEPHONE (OUTPATIENT)
Dept: OBGYN | Facility: CLINIC | Age: 80
End: 2025-05-30
Payer: MEDICARE

## 2025-05-30 NOTE — TELEPHONE ENCOUNTER
5/30 spoke to pt to r/s appt on 6/3 at 11:30 (provider out of office) pt requested that she could call back to r/s appt at later time. Pt is aware that appt is cancelled and agreed to call back at later time to [facility] appt  -nk

## 2025-06-09 ENCOUNTER — APPOINTMENT (OUTPATIENT)
Dept: URBAN - METROPOLITAN AREA CLINIC 35 | Facility: CLINIC | Age: 80
Setting detail: DERMATOLOGY
End: 2025-06-09

## 2025-06-09 DIAGNOSIS — D17 BENIGN LIPOMATOUS NEOPLASM: ICD-10-CM

## 2025-06-09 DIAGNOSIS — L81.4 OTHER MELANIN HYPERPIGMENTATION: ICD-10-CM

## 2025-06-09 DIAGNOSIS — Z71.89 OTHER SPECIFIED COUNSELING: ICD-10-CM

## 2025-06-09 DIAGNOSIS — L81.8 OTHER SPECIFIED DISORDERS OF PIGMENTATION: ICD-10-CM

## 2025-06-09 DIAGNOSIS — D18.0 HEMANGIOMA: ICD-10-CM

## 2025-06-09 DIAGNOSIS — B35.1 TINEA UNGUIUM: ICD-10-CM

## 2025-06-09 DIAGNOSIS — D22 MELANOCYTIC NEVI: ICD-10-CM

## 2025-06-09 DIAGNOSIS — L82.1 OTHER SEBORRHEIC KERATOSIS: ICD-10-CM

## 2025-06-09 DIAGNOSIS — L57.0 ACTINIC KERATOSIS: ICD-10-CM

## 2025-06-09 PROBLEM — D22.71 MELANOCYTIC NEVI OF RIGHT LOWER LIMB, INCLUDING HIP: Status: ACTIVE | Noted: 2025-06-09

## 2025-06-09 PROBLEM — D22.5 MELANOCYTIC NEVI OF TRUNK: Status: ACTIVE | Noted: 2025-06-09

## 2025-06-09 PROBLEM — D18.01 HEMANGIOMA OF SKIN AND SUBCUTANEOUS TISSUE: Status: ACTIVE | Noted: 2025-06-09

## 2025-06-09 PROBLEM — D22.62 MELANOCYTIC NEVI OF LEFT UPPER LIMB, INCLUDING SHOULDER: Status: ACTIVE | Noted: 2025-06-09

## 2025-06-09 PROBLEM — D17.22 BENIGN LIPOMATOUS NEOPLASM OF SKIN AND SUBCUTANEOUS TISSUE OF LEFT ARM: Status: ACTIVE | Noted: 2025-06-09

## 2025-06-09 PROBLEM — D22.72 MELANOCYTIC NEVI OF LEFT LOWER LIMB, INCLUDING HIP: Status: ACTIVE | Noted: 2025-06-09

## 2025-06-09 PROBLEM — D23.39 OTHER BENIGN NEOPLASM OF SKIN OF OTHER PARTS OF FACE: Status: ACTIVE | Noted: 2025-06-09

## 2025-06-09 PROBLEM — D22.39 MELANOCYTIC NEVI OF OTHER PARTS OF FACE: Status: ACTIVE | Noted: 2025-06-09

## 2025-06-09 PROBLEM — D22.61 MELANOCYTIC NEVI OF RIGHT UPPER LIMB, INCLUDING SHOULDER: Status: ACTIVE | Noted: 2025-06-09

## 2025-06-09 PROCEDURE — ? PHOTO-DOCUMENTATION

## 2025-06-09 PROCEDURE — ? COUNSELING

## 2025-06-09 PROCEDURE — ? SUNSCREEN TREATMENT REGIMEN

## 2025-06-09 PROCEDURE — ? LIQUID NITROGEN

## 2025-06-09 ASSESSMENT — LOCATION DETAILED DESCRIPTION DERM
LOCATION DETAILED: LOWER STERNUM
LOCATION DETAILED: INFERIOR MID FOREHEAD
LOCATION DETAILED: RIGHT ANTERIOR PROXIMAL THIGH
LOCATION DETAILED: LEFT MEDIAL FOREHEAD
LOCATION DETAILED: EPIGASTRIC SKIN
LOCATION DETAILED: LEFT SUPERIOR MEDIAL UPPER BACK
LOCATION DETAILED: LEFT ELBOW
LOCATION DETAILED: RIGHT ANTERIOR PROXIMAL UPPER ARM
LOCATION DETAILED: STERNAL NOTCH
LOCATION DETAILED: SUPERIOR THORACIC SPINE
LOCATION DETAILED: RIGHT ANTERIOR DISTAL THIGH
LOCATION DETAILED: INFERIOR THORACIC SPINE
LOCATION DETAILED: LEFT PROXIMAL DORSAL FOREARM
LOCATION DETAILED: LEFT DORSAL GREAT TOE
LOCATION DETAILED: LEFT INFERIOR MEDIAL FOREHEAD
LOCATION DETAILED: RIGHT ANTERIOR DISTAL UPPER ARM
LOCATION DETAILED: LEFT ANTERIOR PROXIMAL UPPER ARM
LOCATION DETAILED: LEFT ANTERIOR DISTAL UPPER ARM
LOCATION DETAILED: LEFT LATERAL INFERIOR EYELID
LOCATION DETAILED: RIGHT SUPERIOR LATERAL FOREHEAD
LOCATION DETAILED: RIGHT GREAT TOENAIL
LOCATION DETAILED: LEFT ANTERIOR DISTAL THIGH
LOCATION DETAILED: LEFT MEDIAL UPPER BACK
LOCATION DETAILED: SUPERIOR MID FOREHEAD
LOCATION DETAILED: MIDDLE STERNUM
LOCATION DETAILED: LEFT ANTERIOR PROXIMAL THIGH

## 2025-06-09 ASSESSMENT — LOCATION SIMPLE DESCRIPTION DERM
LOCATION SIMPLE: INFERIOR FOREHEAD
LOCATION SIMPLE: CHEST
LOCATION SIMPLE: LEFT FOREHEAD
LOCATION SIMPLE: UPPER BACK
LOCATION SIMPLE: RIGHT FOREHEAD
LOCATION SIMPLE: ABDOMEN
LOCATION SIMPLE: RIGHT THIGH
LOCATION SIMPLE: LEFT ELBOW
LOCATION SIMPLE: LEFT INFERIOR EYELID
LOCATION SIMPLE: SUPERIOR FOREHEAD
LOCATION SIMPLE: LEFT UPPER BACK
LOCATION SIMPLE: RIGHT GREAT TOE
LOCATION SIMPLE: LEFT FOREARM
LOCATION SIMPLE: LEFT GREAT TOE
LOCATION SIMPLE: RIGHT UPPER ARM
LOCATION SIMPLE: LEFT UPPER ARM
LOCATION SIMPLE: LEFT THIGH

## 2025-06-09 ASSESSMENT — LOCATION ZONE DERM
LOCATION ZONE: ARM
LOCATION ZONE: EYELID
LOCATION ZONE: TRUNK
LOCATION ZONE: TOE
LOCATION ZONE: FACE
LOCATION ZONE: LEG
LOCATION ZONE: TOENAIL

## 2025-06-09 NOTE — PROCEDURE: MIPS QUALITY
Quality 111:Pneumonia Vaccination Status For Older Adults: Pneumococcal Vaccination Previously Received
Quality 130: Documentation Of Current Medications In The Medical Record: Current Medications Documented
Detail Level: Detailed
Quality 226: Preventive Care And Screening: Tobacco Use: Screening And Cessation Intervention: Patient screened for tobacco use and is an ex/non-smoker
Quality 47: Advance Care Plan: Advance Care Planning discussed and documented in the medical record; patient did not wish or was not able to name a surrogate decision maker or provide an advance care plan.

## 2025-07-25 ENCOUNTER — HOSPITAL ENCOUNTER (OUTPATIENT)
Dept: LAB | Facility: MEDICAL CENTER | Age: 80
End: 2025-07-25
Payer: MEDICARE

## 2025-07-25 ENCOUNTER — RESULTS FOLLOW-UP (OUTPATIENT)
Dept: CARDIOLOGY | Facility: MEDICAL CENTER | Age: 80
End: 2025-07-25
Payer: MEDICARE

## 2025-07-25 DIAGNOSIS — Z13.9 ENCOUNTER FOR SCREENING: ICD-10-CM

## 2025-07-25 DIAGNOSIS — E78.5 DYSLIPIDEMIA: ICD-10-CM

## 2025-07-25 DIAGNOSIS — R89.9 ABNORMAL LABORATORY TEST: ICD-10-CM

## 2025-07-25 LAB
ALBUMIN SERPL BCP-MCNC: 4.1 G/DL (ref 3.2–4.9)
ALBUMIN/GLOB SERPL: 1.4 G/DL
ALP SERPL-CCNC: 80 U/L (ref 30–99)
ALT SERPL-CCNC: 14 U/L (ref 2–50)
ANION GAP SERPL CALC-SCNC: 12 MMOL/L (ref 7–16)
AST SERPL-CCNC: 19 U/L (ref 12–45)
BASOPHILS # BLD AUTO: 1.3 % (ref 0–1.8)
BASOPHILS # BLD: 0.06 K/UL (ref 0–0.12)
BILIRUB SERPL-MCNC: 0.4 MG/DL (ref 0.1–1.5)
BUN SERPL-MCNC: 14 MG/DL (ref 8–22)
CALCIUM ALBUM COR SERPL-MCNC: 9.7 MG/DL (ref 8.5–10.5)
CALCIUM SERPL-MCNC: 9.8 MG/DL (ref 8.5–10.5)
CHLORIDE SERPL-SCNC: 102 MMOL/L (ref 96–112)
CHOLEST SERPL-MCNC: 234 MG/DL (ref 100–199)
CO2 SERPL-SCNC: 26 MMOL/L (ref 20–33)
CREAT SERPL-MCNC: 0.71 MG/DL (ref 0.5–1.4)
EOSINOPHIL # BLD AUTO: 0.11 K/UL (ref 0–0.51)
EOSINOPHIL NFR BLD: 2.3 % (ref 0–6.9)
ERYTHROCYTE [DISTWIDTH] IN BLOOD BY AUTOMATED COUNT: 43 FL (ref 35.9–50)
FASTING STATUS PATIENT QL REPORTED: NORMAL
GFR SERPLBLD CREATININE-BSD FMLA CKD-EPI: 86 ML/MIN/1.73 M 2
GLOBULIN SER CALC-MCNC: 3 G/DL (ref 1.9–3.5)
GLUCOSE SERPL-MCNC: 97 MG/DL (ref 65–99)
HCT VFR BLD AUTO: 43.8 % (ref 37–47)
HCV AB SER QL: NORMAL
HDLC SERPL-MCNC: 64 MG/DL
HGB BLD-MCNC: 14.6 G/DL (ref 12–16)
IMM GRANULOCYTES # BLD AUTO: 0.02 K/UL (ref 0–0.11)
IMM GRANULOCYTES NFR BLD AUTO: 0.4 % (ref 0–0.9)
LDLC SERPL CALC-MCNC: 150 MG/DL
LYMPHOCYTES # BLD AUTO: 1.1 K/UL (ref 1–4.8)
LYMPHOCYTES NFR BLD: 23.2 % (ref 22–41)
MCH RBC QN AUTO: 32.4 PG (ref 27–33)
MCHC RBC AUTO-ENTMCNC: 33.3 G/DL (ref 32.2–35.5)
MCV RBC AUTO: 97.1 FL (ref 81.4–97.8)
MONOCYTES # BLD AUTO: 0.53 K/UL (ref 0–0.85)
MONOCYTES NFR BLD AUTO: 11.2 % (ref 0–13.4)
NEUTROPHILS # BLD AUTO: 2.92 K/UL (ref 1.82–7.42)
NEUTROPHILS NFR BLD: 61.6 % (ref 44–72)
NRBC # BLD AUTO: 0 K/UL
NRBC BLD-RTO: 0 /100 WBC (ref 0–0.2)
PLATELET # BLD AUTO: 252 K/UL (ref 164–446)
PMV BLD AUTO: 8.7 FL (ref 9–12.9)
POTASSIUM SERPL-SCNC: 4.5 MMOL/L (ref 3.6–5.5)
PROT SERPL-MCNC: 7.1 G/DL (ref 6–8.2)
RBC # BLD AUTO: 4.51 M/UL (ref 4.2–5.4)
SODIUM SERPL-SCNC: 140 MMOL/L (ref 135–145)
TRIGL SERPL-MCNC: 102 MG/DL (ref 0–149)
WBC # BLD AUTO: 4.7 K/UL (ref 4.8–10.8)

## 2025-07-25 PROCEDURE — 85025 COMPLETE CBC W/AUTO DIFF WBC: CPT

## 2025-07-25 PROCEDURE — 80061 LIPID PANEL: CPT

## 2025-07-25 PROCEDURE — 36415 COLL VENOUS BLD VENIPUNCTURE: CPT

## 2025-07-25 PROCEDURE — 80053 COMPREHEN METABOLIC PANEL: CPT

## 2025-07-25 PROCEDURE — 86803 HEPATITIS C AB TEST: CPT

## 2025-07-30 ENCOUNTER — OFFICE VISIT (OUTPATIENT)
Dept: MEDICAL GROUP | Facility: PHYSICIAN GROUP | Age: 80
End: 2025-07-30
Payer: MEDICARE

## 2025-07-30 VITALS
TEMPERATURE: 97.5 F | BODY MASS INDEX: 26.37 KG/M2 | SYSTOLIC BLOOD PRESSURE: 124 MMHG | WEIGHT: 174 LBS | RESPIRATION RATE: 14 BRPM | HEART RATE: 67 BPM | HEIGHT: 68 IN | OXYGEN SATURATION: 97 % | DIASTOLIC BLOOD PRESSURE: 70 MMHG

## 2025-07-30 DIAGNOSIS — Z00.00 ENCOUNTER FOR MEDICARE ANNUAL WELLNESS EXAM: Primary | ICD-10-CM

## 2025-07-30 PROBLEM — D23.30 BENIGN NEOPLASM OF SKIN OF FACE: Status: RESOLVED | Noted: 2021-04-20 | Resolved: 2025-07-30

## 2025-07-30 PROBLEM — D22.72 MELANOCYTIC NEVUS OF LEFT LOWER EXTREMITY: Status: RESOLVED | Noted: 2020-02-05 | Resolved: 2025-07-30

## 2025-07-30 PROBLEM — D22.71 MELANOCYTIC NEVUS OF RIGHT LOWER EXTREMITY: Status: ACTIVE | Noted: 2020-02-05

## 2025-07-30 PROBLEM — D22.39 MELANOCYTIC NEVI OF OTHER PARTS OF FACE: Status: RESOLVED | Noted: 2018-11-07 | Resolved: 2025-07-30

## 2025-07-30 PROBLEM — M71.30 CYST OF BURSA: Status: RESOLVED | Noted: 2023-06-01 | Resolved: 2025-07-30

## 2025-07-30 PROBLEM — B35.1 ONYCHOMYCOSIS DUE TO DERMATOPHYTE: Status: RESOLVED | Noted: 2025-06-09 | Resolved: 2025-07-30

## 2025-07-30 PROBLEM — D18.01 HEMANGIOMA OF SKIN AND SUBCUTANEOUS TISSUE: Status: RESOLVED | Noted: 2018-11-07 | Resolved: 2025-07-30

## 2025-07-30 ASSESSMENT — PATIENT HEALTH QUESTIONNAIRE - PHQ9: CLINICAL INTERPRETATION OF PHQ2 SCORE: 0

## 2025-07-30 ASSESSMENT — ACTIVITIES OF DAILY LIVING (ADL): BATHING_REQUIRES_ASSISTANCE: 0

## 2025-07-30 ASSESSMENT — ENCOUNTER SYMPTOMS: GENERAL WELL-BEING: EXCELLENT

## 2025-07-30 ASSESSMENT — FIBROSIS 4 INDEX: FIB4 SCORE: 1.61

## 2025-07-30 NOTE — PROGRESS NOTES
Chief Complaint   Patient presents with    Annual Exam       HPI:  Tracy Sam is a 80 y.o. here for Medicare Annual Wellness Visit     Patient Active Problem List    Diagnosis Date Noted    Somatic dysfunction of pelvic region 05/08/2025    Somatic dysfunction of spine, lumbar 05/08/2025    Costochondritis 03/27/2025    H/O total hysterectomy 02/11/2025    Strain of tendon of right rotator cuff 05/16/2023    Impingement syndrome of right shoulder 05/16/2023    Bicipital tendinitis of right shoulder 05/16/2023    Dyslipidemia 07/29/2022    Subclavian artery stenosis, right (HCC)        Current Medications[1]       Current supplements as per medication list.     Allergies: Patient has no known allergies.    Current social contact/activities: Has many friends that she is persistently in touch with. As well as visiting with family and going out to community events.      She  reports that she quit smoking about 24 years ago. Her smoking use included cigarettes. She has never used smokeless tobacco. She reports current alcohol use of about 0.6 oz of alcohol per week. She reports that she does not use drugs.  Counseling given: Not Answered      ROS:    Gait: Uses no assistive device  Ostomy: No  Other tubes: No  Amputations: No  Chronic oxygen use: No  Last eye exam: Less than 1 year ago  Wears hearing aids: No   : Reports urinary leakage during the last 6 months that has interfered a lot with their daily activities or sleep. Is seeing a urogynocologist    Screening:  UTD  Depression Screening  Little interest or pleasure in doing things?  0 - not at all  Feeling down, depressed , or hopeless? 0 - not at all  Patient Health Questionnaire Score: 0     If depressive symptoms identified deferred to follow up visit unless specifically addressed in assessment and plan.    Interpretation of PHQ-9 Total Score   Score Severity   1-4 No Depression   5-9 Mild Depression   10-14 Moderate Depression   15-19 Moderately  Severe Depression   20-27 Severe Depression    Screening for Cognitive Impairment  Do you or any of your friends or family members have any concern about your memory? No  Three Minute Recall (Village, Kitchen, Baby) 2/3    Seun clock face with all 12 numbers and set the hands to show 10 minutes past 11.  Yes    Cognitive concerns identified deferred for follow up unless specifically addressed in assessment and plan.    Fall Risk Assessment  Has the patient had two or more falls in the last year or any fall with injury in the last year?  No    Safety Assessment  Do you always wear your seatbelt?  Yes  Any changes to home needed to function safely? No  Difficulty hearing.  No  Patient counseled about all safety risks that were identified.    Functional Assessment ADLs  Are there any barriers preventing you from cooking for yourself or meeting nutritional needs?  No.    Are there any barriers preventing you from driving safely or obtaining transportation?  No.    Are there any barriers preventing you from using a telephone or calling for help?  No    Are there any barriers preventing you from shopping?  No.    Are there any barriers preventing you from taking care of your own finances?  No    Are there any barriers preventing you from managing your medications?  No    Are there any barriers preventing you from showering, bathing or dressing yourself? No    Are there any barriers preventing you from doing housework or laundry? No  Are there any barriers preventing you from using the toilet?No  Are you currently engaging in any exercise or physical activity?  Yes. Walking     Self-Assessment of Health  What is your perception of your health? Excellent    Do you sleep more than six hours a night? Yes    In the past 7 days, how much did pain keep you from doing your normal work? None    Do you spend quality time with family or friends (virtually or in person)? Yes    Do you usually eat a heart healthy diet that constists  of a variety of fruits, vegetables, whole grains and fiber? Yes    Do you eat foods high in fat and/or Fast Food more than three times per week? No    How concerned are you that your medical conditions are not being well managed? Not at all    Are you worried that in the next 2 months, you may not have stable housing that you own, rent, or stay in as part of a household? No      Advance Care Planning  Do you have an Advance Directive, Living Will, Durable Power of , or POLST? Yes  Advance Directive       is not on file - instructed patient to bring in a copy to scan into their chart      Health Maintenance Summary            Current Care Gaps       Annual Wellness Visit (Yearly) Never done     No completion history exists for this topic.                      Upcoming       Zoster (Shingles) Vaccines (2 of 3) Postponed until 8/27/2025 09/22/2016  Outside Immunization: Zoster, Live (Zostavax)              IMM DTaP/Tdap/Td Vaccine (1 - Tdap) Postponed until 3/27/2026     No completion history exists for this topic.              Influenza Vaccine (1) Next due on 9/1/2025 01/19/2018  Outside Immunization: Influenza, High Dose              Bone Density Scan (Every 5 Years) Next due on 2/19/2026 02/19/2021  DS-BONE DENSITY STUDY (DEXA)                      Completed or No Longer Recommended       Pneumococcal Vaccine: 50+ Years (Series Information) Completed      05/10/2023  Outside Immunization: PCV20    01/14/2018  Outside Immunization: PPSV23              Hepatitis A Vaccine (Hep A) (Series Information) Aged Out     No completion history exists for this topic.              Hepatitis B Vaccine (Hep B) (Series Information) Aged Out     No completion history exists for this topic.              HPV Vaccines (Series Information) Aged Out     No completion history exists for this topic.              Polio Vaccine (Inactivated Polio) (Series Information) Aged Out     No completion history exists for  this topic.              Meningococcal Immunization (Series Information) Aged Out     No completion history exists for this topic.              Meningococcal B Vaccine (Series Information) Aged Out     No completion history exists for this topic.              Mammogram  Discontinued        Frequency changed to Never automatically (Topic No Longer Applies)    02/28/2024  MA-SCREENING MAMMO BILAT W/TOMOSYNTHESIS W/CAD    02/19/2021  MA-DIAGNOSTIC MAMMO LEFT W/TOMOSYNTHESIS W/CAD    07/16/2020  MA-SCREENING MAMMO BILAT W/TOMOSYNTHESIS W/CAD    05/22/2019  MA-SCREENING MAMMO BILAT W/TOMOSYNTHESIS W/CAD     Only the first 5 history entries have been loaded, but more history exists.            COVID-19 Vaccine  Discontinued      01/13/2022  Imm Admin: MODERNA SARS-COV-2 VACCINE (12+)    03/18/2021  Imm Admin: MODERNA SARS-COV-2 VACCINE (12+)    02/18/2021  Imm Admin: MODERNA SARS-COV-2 VACCINE (12+)              Hepatitis C Screening  Discontinued        Frequency changed to Never automatically (Topic No Longer Applies)    07/25/2025  Hepatitis C Antibody component of HEP C VIRUS ANTIBODY                            Patient Care Team:  Holden Hebert D.O. as PCP - General (Family Medicine)        Social History[2]  Family History   Problem Relation Age of Onset    Heart Disease Father     Breast Cancer Sister 59    Diabetes Neg Hx     Stroke Neg Hx      She  has a past medical history of Arthritis, Bronchitis, Cataract, Dental disorder, Digital mucous cyst of left hand (08/04/2021), Dyslipidemia (07/29/2022), Essential hypertension, benign (07/29/2022), Essential hypertension, benign (07/29/2022), H/O total hysterectomy (02/11/2025), Osteoarthritis (08/05/2009), Pain (05/2023), PVD (peripheral vascular disease) (HCC), PVD (peripheral vascular disease) (HCC) (07/29/2022), Subclavian artery stenosis, right (HCC), and Urinary incontinence.    She has no past medical history of Addisons disease (HCC), Adrenal disorder  "(HCC), Allergy, Anemia, Anxiety, Arrhythmia, Asthma, Blood transfusion without reported diagnosis, Breast cancer (HCC), Cancer (HCC), CHF (congestive heart failure) (HCC), Clotting disorder (HCC), COPD (chronic obstructive pulmonary disease) (HCC), Cushings syndrome (HCC), Depression, Diabetes (HCC), Diabetic neuropathy (HCC), GERD (gastroesophageal reflux disease), Glaucoma, Goiter, Head ache, Heart attack (HCC), Heart murmur, HIV (human immunodeficiency virus infection) (HCC), IBD (inflammatory bowel disease), Kidney disease, Meningitis, Migraine, Muscle disorder, Osteoporosis, Parathyroid disorder (HCC), Pituitary disease (HCC), Pulmonary emphysema (HCC), Seizure (HCC), Sickle cell disease (HCC), Stroke (HCC), Substance abuse (HCC), Thyroid disease, or Tuberculosis.   Past Surgical History[3]    Exam:   /70   Pulse 67   Temp 36.4 °C (97.5 °F) (Temporal)   Resp 14   Ht 1.727 m (5' 8\")   Wt 78.9 kg (174 lb)   SpO2 97%  Body mass index is 26.46 kg/m².    Hearing good.    Dentition good  Alert, oriented in no acute distress.  Eye contact is good, speech goal directed, affect calm    Assessment and Plan. The following treatment and monitoring plan is recommended:  None, UTD  There are no diagnoses linked to this encounter.    Services suggested: No services needed at this time  Health Care Screening: Age-appropriate preventive services recommended by USPTF and ACIP covered by Medicare were discussed today. Services ordered if indicated and agreed upon by the patient.  Referrals offered: Community-based lifestyle interventions to reduce health risks and promote self-management and wellness, fall prevention, nutrition, physical activity, tobacco-use cessation, weight loss, and mental health services as per orders if indicated.    Discussion today about general wellness and lifestyle habits:    Prevent falls and reduce trip hazards; Cautioned about securing or removing rugs.  Have a working fire alarm and " carbon monoxide detector;   Engage in regular physical activity and social activities     Follow-up: No follow-ups on file.         [1]   Current Outpatient Medications   Medication Sig Dispense Refill    estradiol (ESTRACE VAGINAL) 0.1 MG/GM vaginal cream Apply 1g cream inside vagina using applicator nightly for 2 weeks, then twice per week thereafter 42.5 g 3    Omega-3 Fatty Acids (FISH OIL PO) Take 1 Capsule by mouth every day.      TURMERIC PO Take 1 Tablet by mouth every day.      GLUCOSAMINE-CHONDROITIN PO Take 1 Tablet by mouth every day.      therapeutic multivitamin-minerals (THERAGRAN-M) Tab Take 1 Tablet by mouth every day.      clobetasol (TEMOVATE) 0.05 % Ointment Place small amount to external vulva twice per day for 1 month, once per day for one month, then twice per week. (Patient not taking: Reported on 2025) 1 Each 3     No current facility-administered medications for this visit.   [2]   Social History  Tobacco Use    Smoking status: Former     Current packs/day: 0.00     Types: Cigarettes     Quit date: 2001     Years since quittin.5    Smokeless tobacco: Never   Vaping Use    Vaping status: Never Used   Substance Use Topics    Alcohol use: Yes     Alcohol/week: 0.6 oz     Types: 1 Glasses of wine per week     Comment: occ/rare    Drug use: Never   [3]   Past Surgical History:  Procedure Laterality Date    PB SHLDR ARTHROSCOP,SURG,W/ROTAT CUFF REPB Right 2023    Procedure: Shoulder arthroscopy with subacromial decompression, extensive debridement, bicep tenodesis, balloon placement,  rotator cuff repair;  Surgeon: Pepito Johnson M.D.;  Location: SURGERY [facility];  Service: Orthopedics    HYSTERECTOMY, TOTAL ABDOMINAL      fibroids    APPENDECTOMY      CATARACT EXTRACTION WITH IOL Bilateral     COLONOSCOPY      COLONOSCOPY      neg    PRIMARY C SECTION      x 2    TONSILLECTOMY

## (undated) DEVICE — SODIUM CHL. IRRIGATION 0.9% 3000ML (4EA/CA 65CA/PF)

## (undated) DEVICE — GLOVE BIOGEL INDICATOR SZ 8 SURGICAL PF LTX - (50/BX 4BX/CA)

## (undated) DEVICE — TUBING DAY USE W/CARTRIDGE (10EA/BX)

## (undated) DEVICE — GOWN SURGICAL X-LARGE ULTRA - FILM-REINFORCED (20/CA)

## (undated) DEVICE — COVER LIGHT HANDLE FLEXIBLE - SOFT (2EA/PK 80PK/CA)

## (undated) DEVICE — SYRINGE 30 ML LL (56/BX)

## (undated) DEVICE — PACK ARTHROSCOPY - (2EA//CA)

## (undated) DEVICE — TUBING CASSETTE CROSSFLOW INTEGRATED (10EA/CA)

## (undated) DEVICE — ABLATOR WAND SERFAS 90-S CRUISE

## (undated) DEVICE — SHAVER, 5.5 RESECTOR

## (undated) DEVICE — SENSOR OXIMETER ADULT SPO2 RD SET (20EA/BX)

## (undated) DEVICE — NEEDLE SAFETY 18 GA X 1 1/2 IN (100EA/BX)

## (undated) DEVICE — SPONGE GAUZESTER 4 X 4 4PLY - (128PK/CA)

## (undated) DEVICE — GLOVE BIOGEL SZ 8 SURGICAL PF LTX - (50PR/BX 4BX/CA)

## (undated) DEVICE — PACK SHOULDER ARTHROSCOPY SM - (2EA/CA)

## (undated) DEVICE — SPIDER SHOULDER HOLDER (12EA/BX)

## (undated) DEVICE — LACTATED RINGERS INJ 1000 ML - (14EA/CA 60CA/PF)

## (undated) DEVICE — SUTURE 3-0 PROLENE PS-1 (12PK/BX)

## (undated) DEVICE — CHLORAPREP 26 ML APPLICATOR - ORANGE TINT(25/CA)

## (undated) DEVICE — DRAPETIBURON SHOULDER W/POUCH - (5EA/CA)

## (undated) DEVICE — TOWEL STOP TIMEOUT SAFETY FLAG (40EA/CA)